# Patient Record
Sex: MALE | Race: WHITE | Employment: FULL TIME | URBAN - METROPOLITAN AREA
[De-identification: names, ages, dates, MRNs, and addresses within clinical notes are randomized per-mention and may not be internally consistent; named-entity substitution may affect disease eponyms.]

---

## 2017-04-05 ENCOUNTER — OFFICE VISIT (OUTPATIENT)
Dept: FAMILY MEDICINE CLINIC | Facility: CLINIC | Age: 37
End: 2017-04-05

## 2017-04-05 VITALS — WEIGHT: 255 LBS | BODY MASS INDEX: 37.77 KG/M2 | HEIGHT: 69 IN

## 2017-04-05 DIAGNOSIS — L50.9 HIVES: Primary | ICD-10-CM

## 2017-04-05 PROCEDURE — 99213 OFFICE O/P EST LOW 20 MIN: CPT | Performed by: NURSE PRACTITIONER

## 2017-04-05 NOTE — PROGRESS NOTES
CHIEF COMPLAINT:   Patient presents with:  Rash      HPI:   Neisha Lopez is a 29year old male who presents with rash for 1 days. Onset was quick ,   Symptoms are intermittent. Location is reported as trunk and extremities.   Description is report THROAT: Patient denies sore throat, throat swelling, or difficulty swallowing. LUNGS: Patient denies shortness of breath or wheezing. CARDIOVASCULAR: denies chest pain or palpitations. GI: denies N/V/C or abdominal pain  NEURO: + sinus headaches.   No Hives  (primary encounter diagnosis)      PLAN:     1. HIVES    Skin care discussed with patient. Instructions and Comfort Care as listed in Patient Instructions.      Medication as below.     Patient instructed to consider non-sedating antihistamine per sukh You may be given medicines to relieve swelling and itching. Follow all instructions when using these medicines. The hives will usually fade in a few days, but can last up to 2 weeks.   Home care  Follow these tips:  · Try to find the cause of the hives and Call your healthcare provider right away if any of these occur:  · Fever of 100.4°F (38.0°C) or higher, or as directed by your healthcare provider  · Redness, swelling, or pain  · Foul-smelling fluid coming from the rash  Call 911  Call 911 if any of the f · Talk with your healthcare provider right away if you think a medicine gave you hives. Watch for anaphalaxis  If you have hives, watch for symptoms of a severe reaction that affects your entire body.  This is called anaphylaxis. Symptoms can include swoll

## 2017-04-05 NOTE — PATIENT INSTRUCTIONS
Hives (Adult)  Hives are pink or red bumps on the skin. These bumps are also known as wheals. The bumps can itch, burn, or sting. Hives can occur anywhere on the body. They vary in size and shape and can form in clusters.  Individual hives can appear and · You may use over-the counter antihistamines to reduce itching. Some older antihistamines, such as diphenhydramine and chlorpheniramine, are inexpensive. But they need to be taken often and may make you sleepy. They are best used at bedtime.  Don’t use dip Urticaria (hives) are red, itchy, and swollen areas on the skin. They are most often an allergic reaction from eating a food or taking a medicine. Sometimes the cause may be unknown. A single hive can vary in size from a half inch to several inches.  Hives When to call 911  Call 911 right away if you have:  · Swelling in your lips, tongue, or throat, called angioedema  · Trouble breathing or swallowing   Date Last Reviewed: 9/20/2015  © [id number] The [address] [place] [address] Lorna Joy [place]

## 2017-09-07 ENCOUNTER — OFFICE VISIT (OUTPATIENT)
Dept: ALLERGY | Facility: CLINIC | Age: 37
End: 2017-09-07

## 2017-09-07 VITALS
HEART RATE: 74 BPM | SYSTOLIC BLOOD PRESSURE: 110 MMHG | RESPIRATION RATE: 20 BRPM | TEMPERATURE: 98 F | BODY MASS INDEX: 38.95 KG/M2 | DIASTOLIC BLOOD PRESSURE: 70 MMHG | WEIGHT: 263 LBS | HEIGHT: 69 IN

## 2017-09-07 DIAGNOSIS — J30.1 CHRONIC SEASONAL ALLERGIC RHINITIS DUE TO POLLEN: Primary | ICD-10-CM

## 2017-09-07 PROCEDURE — 99212 OFFICE O/P EST SF 10 MIN: CPT | Performed by: ALLERGY & IMMUNOLOGY

## 2017-09-07 PROCEDURE — 99214 OFFICE O/P EST MOD 30 MIN: CPT | Performed by: ALLERGY & IMMUNOLOGY

## 2017-09-07 RX ORDER — LEVOCETIRIZINE DIHYDROCHLORIDE 5 MG/1
TABLET, FILM COATED ORAL
Qty: 90 TABLET | Refills: 1 | Status: SHIPPED | OUTPATIENT
Start: 2017-09-07 | End: 2018-09-18

## 2017-09-07 RX ORDER — MONTELUKAST SODIUM 10 MG/1
10 TABLET ORAL NIGHTLY
Qty: 90 TABLET | Refills: 1 | Status: SHIPPED | OUTPATIENT
Start: 2017-09-07 | End: 2018-09-18

## 2017-09-07 NOTE — PATIENT INSTRUCTIONS
Continue  Xyzal, levo cetirizine 5 mg once a night at bedtime  Start Singulair, montelukast 10 mg once a day  Recommend a tetanus shot, Tdap if you have not had one in the past 10 years  Follow-up in 1 year or sooner if needed

## 2017-09-07 NOTE — PROGRESS NOTES
Melida Roque is a 29year old male. HPI:   Patient presents with: Allergies    Patient is a 20-year-old male who presents for follow-up with a chief complaint of allergies.     Patient last seen by me in September 2016    Patient has history of s heartbeat/palpitations, chest pain, edema  Constitutional:  Negative night sweats,weight loss, irritability and lethargy  ENMT:  Negative for ear drainage, hearing loss and nasal drainage  Eyes:  Negative for eye discharge and vision loss  Gastrointestinal years    Follow-up in 1 year or sooner if needed         Orders This Visit:  No orders of the defined types were placed in this encounter.       Meds This Visit:    Signed Prescriptions Disp Refills    Levocetirizine Dihydrochloride 5 MG Oral Tab 90 tablet

## 2018-01-25 ENCOUNTER — NURSE ONLY (OUTPATIENT)
Dept: ALLERGY | Facility: CLINIC | Age: 38
End: 2018-01-25

## 2018-01-25 ENCOUNTER — OFFICE VISIT (OUTPATIENT)
Dept: ALLERGY | Facility: CLINIC | Age: 38
End: 2018-01-25

## 2018-01-25 VITALS
SYSTOLIC BLOOD PRESSURE: 132 MMHG | DIASTOLIC BLOOD PRESSURE: 78 MMHG | HEART RATE: 78 BPM | WEIGHT: 267 LBS | HEIGHT: 68.5 IN | RESPIRATION RATE: 20 BRPM | TEMPERATURE: 98 F | BODY MASS INDEX: 40 KG/M2

## 2018-01-25 DIAGNOSIS — Z91.018 FOOD ALLERGY: ICD-10-CM

## 2018-01-25 DIAGNOSIS — J30.1 SEASONAL ALLERGIC RHINITIS DUE TO POLLEN: ICD-10-CM

## 2018-01-25 DIAGNOSIS — J30.1 CHRONIC SEASONAL ALLERGIC RHINITIS DUE TO POLLEN: Primary | ICD-10-CM

## 2018-01-25 DIAGNOSIS — Z71.6 ENCOUNTER FOR SMOKING CESSATION COUNSELING: ICD-10-CM

## 2018-01-25 PROCEDURE — 95004 PERQ TESTS W/ALRGNC XTRCS: CPT | Performed by: ALLERGY & IMMUNOLOGY

## 2018-01-25 PROCEDURE — 99212 OFFICE O/P EST SF 10 MIN: CPT | Performed by: ALLERGY & IMMUNOLOGY

## 2018-01-25 PROCEDURE — 99214 OFFICE O/P EST MOD 30 MIN: CPT | Performed by: ALLERGY & IMMUNOLOGY

## 2018-01-25 RX ORDER — VARENICLINE TARTRATE 1 MG/1
1 TABLET, FILM COATED ORAL 2 TIMES DAILY
Qty: 60 TABLET | Refills: 1 | Status: SHIPPED | OUTPATIENT
Start: 2018-01-25

## 2018-01-25 NOTE — PROGRESS NOTES
Wale Camacho is a 40year old male. HPI:   Patient presents with: Allergies: Last visit to allergy was 9/2017. Pt notes that he would like to quit smoking and would like Chantix prescribed.   Pt also states that he had some facial swelling after Types: Cigarettes     Last attempt to quit: 4/14/2016  Smokeless tobacco: Never Used                      Comment: 10 cig/day: started again 4/2017  Alcohol use: Yes           0.0 oz/week     Comment: Social       Medications (Active prior to today's visit no murmurs, gallups, or rubs  Abdomen: soft non-tender non-distended  Skin/Hair: no unusual rashes present   Extremities: no edema, cyanosis, or clubbing     ASSESSMENT/PLAN:   Assessment   Chronic seasonal allergic rhinitis due to pollen  (primary encount Visit:    Signed Prescriptions Disp Refills    Varenicline Tartrate (CHANTIX STARTING MONTH PAK) 0.5 MG X 11 & 1 MG X 42 Oral Misc 1 Package 0      Sig: chantix starter pack, take as directed      Varenicline Tartrate (CHANTIX CONTINUING MONTH PAK) 1 MG Or

## 2018-01-25 NOTE — PATIENT INSTRUCTIONS
Begin taking varenicline as directed by your doctor, 1 week before the quit date. When you first start taking this medication, take one 0.5-milligram tablet once a day for 3 days, then increase to one 0.5-milligram tablet twice a day for 4 days.  The dose i

## 2018-03-27 ENCOUNTER — OFFICE VISIT (OUTPATIENT)
Dept: FAMILY MEDICINE CLINIC | Facility: CLINIC | Age: 38
End: 2018-03-27

## 2018-03-27 VITALS
SYSTOLIC BLOOD PRESSURE: 114 MMHG | DIASTOLIC BLOOD PRESSURE: 72 MMHG | HEART RATE: 76 BPM | OXYGEN SATURATION: 98 % | TEMPERATURE: 98 F | RESPIRATION RATE: 14 BRPM | WEIGHT: 271 LBS | BODY MASS INDEX: 40.14 KG/M2 | HEIGHT: 69 IN

## 2018-03-27 DIAGNOSIS — J06.9 VIRAL URI: Primary | ICD-10-CM

## 2018-03-27 DIAGNOSIS — F17.200 CURRENT SMOKER: ICD-10-CM

## 2018-03-27 PROCEDURE — 99213 OFFICE O/P EST LOW 20 MIN: CPT | Performed by: NURSE PRACTITIONER

## 2018-03-27 NOTE — PROGRESS NOTES
CHIEF COMPLAINT:   Patient presents with:  Nasal Congestion      HPI:   Yasmani Brown is a 40year old male who presents for uri symptoms for  1 days. Patient reports congestion. Symptoms have been mild since onset. Treating symptoms with ibuprofen. /72 (BP Location: Right arm, Patient Position: Sitting, Cuff Size: large)   Pulse 76   Temp 97.8 °F (36.6 °C) (Oral)   Resp 14   Ht 69\"   Wt 271 lb   SpO2 98%   BMI 40.02 kg/m²   GENERAL: well developed, well nourished,in no apparent distress  SKIN: · Use humidified air, steamy showers/baths and use vaporizer in sleeping quarters to keep secretions thin. · Saline nasal spray to nostrils if needed to help remove drainage or congestion in nose. · OTC Sudafed. Obtain at pharmacy counter.   Do not sarah · You may use acetaminophen or ibuprofen to control pain and fever, unless another medicine was prescribed. If you have chronic liver or kidney disease, have ever had a stomach ulcer or gastrointestinal bleeding, or are taking blood-thinning medicines, caroline

## 2018-03-27 NOTE — PATIENT INSTRUCTIONS
· Hydrate! (cold or hot based on comfort). Drink lots of water or other non dehydrating liquids to help with illness. Salty foods, soups and tea can help with throat pain.    · Hand washing-use hand  or wash hands frequently, cover your cough o being exposed to cigarette smoke (yours or others’).   · You may use acetaminophen or ibuprofen to control pain and fever, unless another medicine was prescribed. If you have chronic liver or kidney disease, have ever had a stomach ulcer or gastrointestinal

## 2018-09-18 RX ORDER — MONTELUKAST SODIUM 10 MG/1
TABLET ORAL
Qty: 90 TABLET | Refills: 0 | Status: SHIPPED | OUTPATIENT
Start: 2018-09-18 | End: 2019-09-12

## 2018-09-18 RX ORDER — LEVOCETIRIZINE DIHYDROCHLORIDE 5 MG/1
TABLET, FILM COATED ORAL
Qty: 90 TABLET | Refills: 0 | Status: SHIPPED | OUTPATIENT
Start: 2018-09-18 | End: 2019-09-12

## 2018-09-18 NOTE — TELEPHONE ENCOUNTER
Refill requested for   Name from pharmacy: LEVOCETIRIZINE 5 MG TABLET          Will file in chart as: LEVOCETIRIZINE DIHYDROCHLORIDE 5 MG Oral Tab    Sig: TAKE ONE TABLET BY MOUTH EVERY EVENING    Disp:  30 tablet    Refills:  0    Start: 9/18/2018     Cla

## 2018-09-18 NOTE — TELEPHONE ENCOUNTER
Xyzal and singular refill ×90 days.   Patient will need his yearly follow-up by January 2019 or sooner if needed

## 2018-10-22 ENCOUNTER — OFFICE VISIT (OUTPATIENT)
Dept: DERMATOLOGY CLINIC | Facility: CLINIC | Age: 38
End: 2018-10-22
Payer: COMMERCIAL

## 2018-10-22 DIAGNOSIS — D22.9 MULTIPLE NEVI: ICD-10-CM

## 2018-10-22 DIAGNOSIS — D23.30 BENIGN NEOPLASM OF SKIN OF FACE: ICD-10-CM

## 2018-10-22 DIAGNOSIS — L21.9 SEBORRHEIC DERMATITIS: Primary | ICD-10-CM

## 2018-10-22 PROCEDURE — 99202 OFFICE O/P NEW SF 15 MIN: CPT | Performed by: DERMATOLOGY

## 2018-10-22 PROCEDURE — 99212 OFFICE O/P EST SF 10 MIN: CPT | Performed by: DERMATOLOGY

## 2018-10-22 RX ORDER — KETOCONAZOLE 20 MG/ML
SHAMPOO TOPICAL
Qty: 120 ML | Refills: 3 | Status: SHIPPED | OUTPATIENT
Start: 2018-10-22

## 2018-10-29 ENCOUNTER — APPOINTMENT (OUTPATIENT)
Dept: GENERAL RADIOLOGY | Facility: HOSPITAL | Age: 38
End: 2018-10-29
Attending: EMERGENCY MEDICINE
Payer: COMMERCIAL

## 2018-10-29 ENCOUNTER — HOSPITAL ENCOUNTER (EMERGENCY)
Facility: HOSPITAL | Age: 38
Discharge: HOME | End: 2018-10-30
Attending: EMERGENCY MEDICINE
Payer: COMMERCIAL

## 2018-10-29 DIAGNOSIS — R07.9 CHEST PAIN OF UNCERTAIN ETIOLOGY: Primary | ICD-10-CM

## 2018-10-29 PROCEDURE — 80048 BASIC METABOLIC PNL TOTAL CA: CPT | Performed by: EMERGENCY MEDICINE

## 2018-10-29 PROCEDURE — 71046 X-RAY EXAM CHEST 2 VIEWS: CPT | Performed by: EMERGENCY MEDICINE

## 2018-10-29 PROCEDURE — 93005 ELECTROCARDIOGRAM TRACING: CPT

## 2018-10-29 PROCEDURE — 85379 FIBRIN DEGRADATION QUANT: CPT | Performed by: EMERGENCY MEDICINE

## 2018-10-29 PROCEDURE — 85025 COMPLETE CBC W/AUTO DIFF WBC: CPT | Performed by: EMERGENCY MEDICINE

## 2018-10-29 PROCEDURE — 93010 ELECTROCARDIOGRAM REPORT: CPT | Performed by: EMERGENCY MEDICINE

## 2018-10-29 PROCEDURE — 84484 ASSAY OF TROPONIN QUANT: CPT | Performed by: EMERGENCY MEDICINE

## 2018-10-29 PROCEDURE — 99285 EMERGENCY DEPT VISIT HI MDM: CPT

## 2018-10-29 PROCEDURE — 36415 COLL VENOUS BLD VENIPUNCTURE: CPT

## 2018-10-30 VITALS
RESPIRATION RATE: 18 BRPM | WEIGHT: 300 LBS | DIASTOLIC BLOOD PRESSURE: 69 MMHG | OXYGEN SATURATION: 97 % | SYSTOLIC BLOOD PRESSURE: 106 MMHG | TEMPERATURE: 98 F | HEART RATE: 57 BPM | BODY MASS INDEX: 44.43 KG/M2 | HEIGHT: 69 IN

## 2018-10-30 PROCEDURE — 93010 ELECTROCARDIOGRAM REPORT: CPT | Performed by: EMERGENCY MEDICINE

## 2018-10-30 PROCEDURE — 84484 ASSAY OF TROPONIN QUANT: CPT | Performed by: EMERGENCY MEDICINE

## 2018-10-30 PROCEDURE — 93005 ELECTROCARDIOGRAM TRACING: CPT

## 2018-10-30 NOTE — ED INITIAL ASSESSMENT (HPI)
Pt c/o chest tightness since this afternoon, denies any chest pain/SOB/nausea/vomiting/fever/cough, sts that he has watery stool x6 since this morning, denies abdominal pain

## 2018-10-30 NOTE — ED PROVIDER NOTES
Patient Seen in: [facility] AND [facility] Emergency Department    History   Patient presents with:  Chest Pain Angina (cardiovascular): Chest tightness with no chest pain    Stated Complaint: chest tightness    HPI    16-year-old male with history of allergic r Skin: Negative for rash. Neurological: Negative for weakness, light-headedness and headaches. All other systems reviewed and are negative. Positive for stated complaint: chest tightness  Other systems are as noted in HPI.   Constitutional and vit V1/2      Pulse Oximeter:  Pulse oximetry on room air is 98%, indicating adequate oxygenation.     PROCEDURES:  none    DIAGNOSTICS:   Labs:  Recent Results (from the past 24 hour(s))   BASIC METABOLIC PANEL (8)    Collection Time: 10/29/18 11:45 PM   Resul  140 - 400 K/UL    MPV 6.6 (L) 7.4 - 10.3 fL    Neutrophil % 47 %    Lymphocyte % 41 %    Monocyte % 9 %    Eosinophil % 1 %    Basophil % 1 %    Neutrophil Absolute 3.6 1.8 - 7.7 K/UL    Lymphocyte Absolute 3.2 1.0 - 4.0 K/UL    Monocyte Absolut patient already has has Allergic rhinitis due to pollen; Allergic rhinitis due to other allergen; and Smoking history on their problem list. to contribute to the complexity of his ED evaluation.     - pt  comfortable with d/c at this time, will d/c pt home

## 2018-11-04 NOTE — PROGRESS NOTES
Julian Gastelum is a 41year old male. Patient presents with:  Lesion: New pt presenting for withl lesions to R chin and nose. Pt denies personal and family hx of skin cancer. Patient has no known allergies.     Current Outpatient [facility] Socioeconomic History      Marital status: Single      Spouse name: Not on file      Number of children: Not on file      Years of education: Not on file      Highest education level: Not on file    Social Needs      Financial resource strain: Not on file with concerns above. ROS:    Denies any other systemic complaints. No fevers, chills, night sweats, sensitivity to the sun, deeper lumps or bumps. No other skin complaints. History, medications, allergies as noted.     Physical examination: Patient for this Visit:   Requested Prescriptions     Signed Prescriptions Disp Refills   • Ketoconazole 2 % External Shampoo 120 mL 3     Sig: Apply to scalp 2 times per week.        Seborrheic dermatitis  (primary encounter diagnosis)  Multiple nevi  Benign neopl

## 2018-11-26 ENCOUNTER — OFFICE VISIT (OUTPATIENT)
Dept: INTERNAL MEDICINE CLINIC | Facility: CLINIC | Age: 38
End: 2018-11-26
Payer: COMMERCIAL

## 2018-11-26 VITALS
OXYGEN SATURATION: 99 % | DIASTOLIC BLOOD PRESSURE: 88 MMHG | HEIGHT: 69 IN | BODY MASS INDEX: 45.32 KG/M2 | WEIGHT: 306 LBS | HEART RATE: 80 BPM | SYSTOLIC BLOOD PRESSURE: 122 MMHG | RESPIRATION RATE: 17 BRPM

## 2018-11-26 DIAGNOSIS — R07.9 CHEST PAIN, UNSPECIFIED TYPE: Primary | ICD-10-CM

## 2018-11-26 DIAGNOSIS — E66.01 OBESITY, MORBID, BMI 40.0-49.9 (HCC): ICD-10-CM

## 2018-11-26 DIAGNOSIS — Z00.00 PHYSICAL EXAM: ICD-10-CM

## 2018-11-26 PROCEDURE — 99214 OFFICE O/P EST MOD 30 MIN: CPT | Performed by: FAMILY MEDICINE

## 2018-11-26 NOTE — PROGRESS NOTES
Dorian Neil is a 41year old male who presents for ER follow up   HPI:         F/u ER visit for CP- 3 weeks ago went to [address] [place] [address] ER  Had pressure left side of chest, was not sharp, just pressure   No SOB or heart racing   Was tight  Was not exertional, Fracture of right fibula 2011    per [employer]:       No past surgical history on file.    Family History   Problem Relation Age of Onset   • Asthma Mother    • Allergies Mother    • Cancer Maternal Grandfather       Social History:  Social History    Tobacco 2016  If happens again, pt to f/u and will do further testing and refer to cardiology if needed   Monitor any triggers if occurs again in case is gerd or anxiety  Discussed warning signs of when to go to ER      2.  Obesity, morbid, BMI 40.0-49.9 ([facility] [address]  Dis

## 2018-11-30 PROCEDURE — 80053 COMPREHEN METABOLIC PANEL: CPT | Performed by: FAMILY MEDICINE

## 2018-11-30 PROCEDURE — 80061 LIPID PANEL: CPT | Performed by: FAMILY MEDICINE

## 2018-11-30 PROCEDURE — 84443 ASSAY THYROID STIM HORMONE: CPT | Performed by: FAMILY MEDICINE

## 2019-03-20 ENCOUNTER — OFFICE VISIT (OUTPATIENT)
Dept: FAMILY MEDICINE CLINIC | Facility: CLINIC | Age: 39
End: 2019-03-20
Payer: COMMERCIAL

## 2019-03-20 VITALS
RESPIRATION RATE: 14 BRPM | DIASTOLIC BLOOD PRESSURE: 72 MMHG | HEART RATE: 88 BPM | HEIGHT: 69 IN | BODY MASS INDEX: 44.43 KG/M2 | TEMPERATURE: 98 F | WEIGHT: 300 LBS | SYSTOLIC BLOOD PRESSURE: 110 MMHG

## 2019-03-20 DIAGNOSIS — J00 ACUTE NASOPHARYNGITIS: Primary | ICD-10-CM

## 2019-03-20 PROCEDURE — 99213 OFFICE O/P EST LOW 20 MIN: CPT | Performed by: NURSE PRACTITIONER

## 2019-03-20 NOTE — PROGRESS NOTES
CHIEF COMPLAINT:   Patient presents with:  URI      HPI:   Nicolasa Mai is a 41year old male who presents with URI symptoms for  2-3 days. Onset was gradual, Location is mid face and throat. Symptoms are progressing.  Described as nasal pressure and LUNGS: denies shortness of breath or wheezing or cough. Has mild PND described cough.   CARDIOVASCULAR:patient  denies chest pain or palpitations   GI: patient denies N/V/C or abdominal pain  MUSCULOSKELETAL: denies any osseous, soft tissue, or joint compla consider OTC guaifenesin per box instructions,  consider OTC saline nasal spray per box instructions,  continue antihistamine as prescribed,  to increase water intake and rest.  Will send throat culture and notify of results when available.   Patient instru · Put fluids back into your body. Take frequent sips of clear liquids such as water or broth. Avoid drinks that have a lot of sugar in them, such as juices and sodas. These can make diarrhea worse. Older children and adults can drink sports drinks.   · As y

## 2019-09-12 ENCOUNTER — OFFICE VISIT (OUTPATIENT)
Dept: ALLERGY | Facility: CLINIC | Age: 39
End: 2019-09-12
Payer: COMMERCIAL

## 2019-09-12 VITALS
OXYGEN SATURATION: 96 % | HEART RATE: 71 BPM | TEMPERATURE: 98 F | SYSTOLIC BLOOD PRESSURE: 128 MMHG | RESPIRATION RATE: 17 BRPM | DIASTOLIC BLOOD PRESSURE: 77 MMHG

## 2019-09-12 DIAGNOSIS — Z71.6 ENCOUNTER FOR SMOKING CESSATION COUNSELING: ICD-10-CM

## 2019-09-12 DIAGNOSIS — J30.1 SEASONAL ALLERGIC RHINITIS DUE TO POLLEN: Primary | ICD-10-CM

## 2019-09-12 DIAGNOSIS — J30.1 CHRONIC SEASONAL ALLERGIC RHINITIS DUE TO POLLEN: ICD-10-CM

## 2019-09-12 PROCEDURE — 99214 OFFICE O/P EST MOD 30 MIN: CPT | Performed by: ALLERGY & IMMUNOLOGY

## 2019-09-12 RX ORDER — FLUTICASONE PROPIONATE 50 MCG
2 SPRAY, SUSPENSION (ML) NASAL DAILY
Qty: 1 BOTTLE | Refills: 0 | Status: SHIPPED | OUTPATIENT
Start: 2019-09-12 | End: 2020-08-25

## 2019-09-12 RX ORDER — LEVOCETIRIZINE DIHYDROCHLORIDE 5 MG/1
5 TABLET, FILM COATED ORAL NIGHTLY
Qty: 90 TABLET | Refills: 2 | Status: SHIPPED | OUTPATIENT
Start: 2019-09-12 | End: 2020-08-25

## 2019-09-12 RX ORDER — MONTELUKAST SODIUM 10 MG/1
10 TABLET ORAL NIGHTLY
Qty: 90 TABLET | Refills: 2 | Status: SHIPPED | OUTPATIENT
Start: 2019-09-12 | End: 2020-08-25

## 2019-09-12 NOTE — PROGRESS NOTES
Elana Nunez is a 46year old male. HPI:   Patient presents with: Allergies: Pt reports mostly he has had mostly good days. SOme bad days with a constant runny nose, scratchy throat, watery eyes, can only breathe through one nostril.      Patient Medications:  Ketoconazole 2 % External Shampoo Apply to scalp 2 times per week.  Disp: 120 mL Rfl: 3   LEVOCETIRIZINE DIHYDROCHLORIDE 5 MG Oral Tab TAKE ONE TABLET BY MOUTH EVERY EVENING Disp: 90 tablet Rfl: 0   MONTELUKAST SODIUM 10 MG Oral Tab TAKE 1 TAB counseling      Recs:  May increase Xyzal to 5 mg every 12 hours. Continue with Singulair, montelukast 10 mg once a day  Start Nasacort or Flonase 2 sprays per nostril once a day.   May take a full week to take full effect  Consider ENT evaluation if not i

## 2019-09-12 NOTE — PATIENT INSTRUCTIONS
Recs:  May increase Xyzal to 5 mg every 12 hours. Continue with Singulair, montelukast 10 mg once a day  Start Nasacort or Flonase 2 sprays per nostril once a day. May take a full week to take full effect  Consider ENT evaluation if not improving.   Recom

## 2020-04-30 ENCOUNTER — HOSPITAL ENCOUNTER (EMERGENCY)
Facility: HOSPITAL | Age: 40
Discharge: HOME | End: 2020-04-30
Attending: EMERGENCY MEDICINE
Payer: COMMERCIAL

## 2020-04-30 VITALS
HEIGHT: 69 IN | HEART RATE: 77 BPM | TEMPERATURE: 99 F | RESPIRATION RATE: 18 BRPM | WEIGHT: 310 LBS | SYSTOLIC BLOOD PRESSURE: 124 MMHG | DIASTOLIC BLOOD PRESSURE: 70 MMHG | BODY MASS INDEX: 45.91 KG/M2 | OXYGEN SATURATION: 98 %

## 2020-04-30 DIAGNOSIS — R00.2 PALPITATIONS: Primary | ICD-10-CM

## 2020-04-30 PROCEDURE — 84443 ASSAY THYROID STIM HORMONE: CPT | Performed by: EMERGENCY MEDICINE

## 2020-04-30 PROCEDURE — 84484 ASSAY OF TROPONIN QUANT: CPT | Performed by: EMERGENCY MEDICINE

## 2020-04-30 PROCEDURE — 80048 BASIC METABOLIC PNL TOTAL CA: CPT | Performed by: EMERGENCY MEDICINE

## 2020-04-30 PROCEDURE — 93005 ELECTROCARDIOGRAM TRACING: CPT

## 2020-04-30 PROCEDURE — 99284 EMERGENCY DEPT VISIT MOD MDM: CPT

## 2020-04-30 PROCEDURE — 36415 COLL VENOUS BLD VENIPUNCTURE: CPT

## 2020-04-30 PROCEDURE — 85025 COMPLETE CBC W/AUTO DIFF WBC: CPT | Performed by: EMERGENCY MEDICINE

## 2020-04-30 PROCEDURE — 93010 ELECTROCARDIOGRAM REPORT: CPT | Performed by: EMERGENCY MEDICINE

## 2020-04-30 NOTE — ED INITIAL ASSESSMENT (HPI)
Pt reports left arm feeling tired intermittently x 1 week. Pt states its not a pain. Pt reports his heart beats faster than normal for the past 3 days.  Pt reports occasional etoh use and vaping

## 2020-04-30 NOTE — ED PROVIDER NOTES
Patient Seen in: [facility] AND [facility] Emergency Department      History   Patient presents with:  Chest Pain Angina    Stated Complaint: tired arm and heart beating hard    HPI    Pt is 47 yo M who p/w palpitations that started at rest last night while watc CN intact, normal speech, normal gait, 5/5 motor strength in all extremities, no focal deficits  SKIN: warm, dry, no rashes        ED Course     Labs Reviewed   BASIC METABOLIC PANEL (8) - Normal   TROPONIN I - Normal   TSH W REFLEX TO FREE T4 - Normal   C

## 2020-05-09 ENCOUNTER — TELEMEDICINE (OUTPATIENT)
Dept: INTERNAL MEDICINE CLINIC | Facility: CLINIC | Age: 40
End: 2020-05-09

## 2020-05-09 DIAGNOSIS — Z00.00 PHYSICAL EXAM: ICD-10-CM

## 2020-05-09 DIAGNOSIS — Z83.49 FAMILY HISTORY OF HEMOCHROMATOSIS: ICD-10-CM

## 2020-05-09 DIAGNOSIS — R00.2 PALPITATIONS: ICD-10-CM

## 2020-05-09 DIAGNOSIS — M79.602 PAIN OF LEFT UPPER EXTREMITY: Primary | ICD-10-CM

## 2020-05-09 PROCEDURE — 99213 OFFICE O/P EST LOW 20 MIN: CPT | Performed by: FAMILY MEDICINE

## 2020-05-09 NOTE — PROGRESS NOTES
Virtual video visit   Patient verbally consents to a Virtual/video Check-In visit on 05/04/20. Patient understands and accepts financial responsibility for any deductible, co-insurance and/or co-pays associated with this service.     Duration of the serv Maternal Grandfather       Family Status   Relation Status   • Mo (Not Specified)   • MGFA (Not Specified)      Social History    Tobacco Use      Smoking status: Current Every Day Smoker        Packs/day: 1.00        Years: 10.00        Pack years: 8 because of restrictions of visitation. There are limitations of this visit as no physical exam could be performed. Every conscious effort was taken to allow for sufficient and adequate time.   This billing was spent on reviewing labs, medications, radiolo

## 2020-07-08 ENCOUNTER — OFFICE VISIT (OUTPATIENT)
Dept: INTERNAL MEDICINE CLINIC | Facility: CLINIC | Age: 40
End: 2020-07-08
Payer: COMMERCIAL

## 2020-07-08 VITALS
BODY MASS INDEX: 46.65 KG/M2 | OXYGEN SATURATION: 96 % | WEIGHT: 315 LBS | SYSTOLIC BLOOD PRESSURE: 134 MMHG | DIASTOLIC BLOOD PRESSURE: 82 MMHG | HEART RATE: 76 BPM | HEIGHT: 69 IN

## 2020-07-08 DIAGNOSIS — E66.01 OBESITY, CLASS III, BMI 40-49.9 (MORBID OBESITY) (HCC): ICD-10-CM

## 2020-07-08 DIAGNOSIS — Z00.00 PHYSICAL EXAM: Primary | ICD-10-CM

## 2020-07-08 PROBLEM — E66.813 OBESITY, CLASS III, BMI 40-49.9 (MORBID OBESITY): Status: ACTIVE | Noted: 2020-07-08

## 2020-07-08 PROCEDURE — 99395 PREV VISIT EST AGE 18-39: CPT | Performed by: FAMILY MEDICINE

## 2020-07-08 NOTE — PROGRESS NOTES
Vik Hussein is a 46year old male who presents for a complete physical exam.   HPI:       Diet:   Exercise:  Smoker: stopped cigaerettes- vaping only  Concerns:    Weight  Was doing well prior to covid  Not exercising  Eats good at work  Bad home s • Allergies Mother    • Cancer Maternal Grandfather       Social History:  Social History    Tobacco Use      Smoking status: Current Every Day Smoker        Packs/day: 1.00        Years: 10.00        Pack years: 10        Types: Cigarettes        Quit d hemochromatosis as dad has hemochromatosis and patients wants to be checked / screened   2. Obesity, Class III, BMI 40-49.9 (morbid obesity) ([address]  Pt counseled on importance of weight loss and exercise. Recommend 30 min of cardio activity 5 times a week.

## 2020-07-13 ENCOUNTER — NURSE ONLY (OUTPATIENT)
Dept: INTERNAL MEDICINE CLINIC | Facility: CLINIC | Age: 40
End: 2020-07-13
Payer: COMMERCIAL

## 2020-07-13 DIAGNOSIS — Z00.00 PHYSICAL EXAM: ICD-10-CM

## 2020-07-13 DIAGNOSIS — Z83.49 FAMILY HISTORY OF HEMOCHROMATOSIS: ICD-10-CM

## 2020-07-13 LAB
ALBUMIN SERPL-MCNC: 3.8 G/DL (ref 3.4–5)
ALBUMIN/GLOB SERPL: 0.9 {RATIO} (ref 1–2)
ALP LIVER SERPL-CCNC: 94 U/L (ref 45–117)
ALT SERPL-CCNC: 52 U/L (ref 16–61)
ANION GAP SERPL CALC-SCNC: 6 MMOL/L (ref 0–18)
AST SERPL-CCNC: 27 U/L (ref 15–37)
BASOPHILS # BLD AUTO: 0.06 X10(3) UL (ref 0–0.2)
BASOPHILS NFR BLD AUTO: 1.1 %
BILIRUB SERPL-MCNC: 0.4 MG/DL (ref 0.1–2)
BUN BLD-MCNC: 13 MG/DL (ref 7–18)
BUN/CREAT SERPL: 14.6 (ref 10–20)
CALCIUM BLD-MCNC: 9.2 MG/DL (ref 8.5–10.1)
CHLORIDE SERPL-SCNC: 107 MMOL/L (ref 98–112)
CHOLEST SMN-MCNC: 181 MG/DL (ref ?–200)
CO2 SERPL-SCNC: 26 MMOL/L (ref 21–32)
CREAT BLD-MCNC: 0.89 MG/DL (ref 0.7–1.3)
DEPRECATED HBV CORE AB SER IA-ACNC: 159.1 NG/ML (ref 48–420)
DEPRECATED RDW RBC AUTO: 42.6 FL (ref 35.1–46.3)
EOSINOPHIL # BLD AUTO: 0.08 X10(3) UL (ref 0–0.7)
EOSINOPHIL NFR BLD AUTO: 1.5 %
ERYTHROCYTE [DISTWIDTH] IN BLOOD BY AUTOMATED COUNT: 12.4 % (ref 11–15)
GLOBULIN PLAS-MCNC: 4.2 G/DL (ref 2.8–4.4)
GLUCOSE BLD-MCNC: 88 MG/DL (ref 70–99)
HCT VFR BLD AUTO: 43.5 % (ref 39–53)
HDLC SERPL-MCNC: 38 MG/DL (ref 40–59)
HGB BLD-MCNC: 15.2 G/DL (ref 13–17.5)
IMM GRANULOCYTES # BLD AUTO: 0.03 X10(3) UL (ref 0–1)
IMM GRANULOCYTES NFR BLD: 0.6 %
IRON SATURATION: 20 % (ref 20–50)
IRON SERPL-MCNC: 81 UG/DL (ref 65–175)
LDLC SERPL CALC-MCNC: 120 MG/DL (ref ?–100)
LYMPHOCYTES # BLD AUTO: 2.02 X10(3) UL (ref 1–4)
LYMPHOCYTES NFR BLD AUTO: 37.3 %
M PROTEIN MFR SERPL ELPH: 8 G/DL (ref 6.4–8.2)
MCH RBC QN AUTO: 32.6 PG (ref 26–34)
MCHC RBC AUTO-ENTMCNC: 34.9 G/DL (ref 31–37)
MCV RBC AUTO: 93.3 FL (ref 80–100)
MONOCYTES # BLD AUTO: 0.57 X10(3) UL (ref 0.1–1)
MONOCYTES NFR BLD AUTO: 10.5 %
NEUTROPHILS # BLD AUTO: 2.65 X10 (3) UL (ref 1.5–7.7)
NEUTROPHILS # BLD AUTO: 2.65 X10(3) UL (ref 1.5–7.7)
NEUTROPHILS NFR BLD AUTO: 49 %
NONHDLC SERPL-MCNC: 143 MG/DL (ref ?–130)
OSMOLALITY SERPL CALC.SUM OF ELEC: 288 MOSM/KG (ref 275–295)
PATIENT FASTING Y/N/NP: YES
PATIENT FASTING Y/N/NP: YES
PLATELET # BLD AUTO: 244 10(3)UL (ref 150–450)
POTASSIUM SERPL-SCNC: 4.1 MMOL/L (ref 3.5–5.1)
RBC # BLD AUTO: 4.66 X10(6)UL (ref 4.3–5.7)
SODIUM SERPL-SCNC: 139 MMOL/L (ref 136–145)
TOTAL IRON BINDING CAPACITY: 402 UG/DL (ref 240–450)
TRANSFERRIN SERPL-MCNC: 270 MG/DL (ref 200–360)
TRIGL SERPL-MCNC: 116 MG/DL (ref 30–149)
VLDLC SERPL CALC-MCNC: 23 MG/DL (ref 0–30)
WBC # BLD AUTO: 5.4 X10(3) UL (ref 4–11)

## 2020-07-13 PROCEDURE — 83540 ASSAY OF IRON: CPT | Performed by: FAMILY MEDICINE

## 2020-07-13 PROCEDURE — 85025 COMPLETE CBC W/AUTO DIFF WBC: CPT | Performed by: FAMILY MEDICINE

## 2020-07-13 PROCEDURE — 36415 COLL VENOUS BLD VENIPUNCTURE: CPT | Performed by: FAMILY MEDICINE

## 2020-07-13 PROCEDURE — 80053 COMPREHEN METABOLIC PANEL: CPT | Performed by: FAMILY MEDICINE

## 2020-07-13 PROCEDURE — 84466 ASSAY OF TRANSFERRIN: CPT | Performed by: FAMILY MEDICINE

## 2020-07-13 PROCEDURE — 82728 ASSAY OF FERRITIN: CPT | Performed by: FAMILY MEDICINE

## 2020-07-13 PROCEDURE — 81256 HFE GENE: CPT | Performed by: FAMILY MEDICINE

## 2020-07-13 PROCEDURE — 80061 LIPID PANEL: CPT | Performed by: FAMILY MEDICINE

## 2020-07-13 NOTE — PROGRESS NOTES
Confirmed  & full name, Pt was drawn today for   CBC, CMP, LIPID, IRON, FERRITIN, HERED.  Tolerated blood draw well. KT MA

## 2020-07-18 LAB
H63D HEMOCHROMATOSIS MUTATION: NEGATIVE
S65C HEMOCHROMATOSIS MUTATION: NEGATIVE

## 2020-08-25 ENCOUNTER — OFFICE VISIT (OUTPATIENT)
Dept: ALLERGY | Facility: CLINIC | Age: 40
End: 2020-08-25
Payer: COMMERCIAL

## 2020-08-25 VITALS
OXYGEN SATURATION: 94 % | TEMPERATURE: 99 F | HEIGHT: 68 IN | WEIGHT: 315 LBS | SYSTOLIC BLOOD PRESSURE: 121 MMHG | HEART RATE: 71 BPM | DIASTOLIC BLOOD PRESSURE: 81 MMHG | RESPIRATION RATE: 24 BRPM | BODY MASS INDEX: 47.74 KG/M2

## 2020-08-25 DIAGNOSIS — J30.1 SEASONAL ALLERGIC RHINITIS DUE TO POLLEN: Primary | ICD-10-CM

## 2020-08-25 DIAGNOSIS — Z72.0 CURRENT NICOTINE VAPING ON SOME DAYS: ICD-10-CM

## 2020-08-25 DIAGNOSIS — J30.89 ALLERGIC RHINITIS DUE TO DUST MITE: ICD-10-CM

## 2020-08-25 PROCEDURE — 3074F SYST BP LT 130 MM HG: CPT | Performed by: ALLERGY & IMMUNOLOGY

## 2020-08-25 PROCEDURE — 3079F DIAST BP 80-89 MM HG: CPT | Performed by: ALLERGY & IMMUNOLOGY

## 2020-08-25 PROCEDURE — 99214 OFFICE O/P EST MOD 30 MIN: CPT | Performed by: ALLERGY & IMMUNOLOGY

## 2020-08-25 PROCEDURE — 3008F BODY MASS INDEX DOCD: CPT | Performed by: ALLERGY & IMMUNOLOGY

## 2020-08-25 RX ORDER — LEVOCETIRIZINE DIHYDROCHLORIDE 5 MG/1
5 TABLET, FILM COATED ORAL NIGHTLY
Qty: 90 TABLET | Refills: 1 | Status: SHIPPED | OUTPATIENT
Start: 2020-08-25

## 2020-08-25 RX ORDER — FLUTICASONE PROPIONATE 50 MCG
2 SPRAY, SUSPENSION (ML) NASAL DAILY
Qty: 3 BOTTLE | Refills: 1 | Status: SHIPPED | OUTPATIENT
Start: 2020-08-25

## 2020-08-25 RX ORDER — MONTELUKAST SODIUM 10 MG/1
10 TABLET ORAL NIGHTLY
Qty: 90 TABLET | Refills: 1 | Status: SHIPPED | OUTPATIENT
Start: 2020-08-25

## 2020-08-25 NOTE — PROGRESS NOTES
Melida Roque is a 46year old male. HPI:   Patient presents with: Allergies: Patient presents for 11 1/2 month follow-up for AR, seasonal and environmental allergies.      Patient is a 67-year-old male who presents for follow-up with a chief comp Tab Take 1 tablet (10 mg total) by mouth nightly.  90 tablet 1   • Varenicline Tartrate (CHANTIX STARTING MONTH ARTI) 0.5 MG X 11 & 1 MG X 42 Oral Misc Use as directed based upon starter pack 1 Package 0   • Ketoconazole 2 % External Shampoo Apply to scalp 2 nicotine vaping on some days    1. ar  Recs:  Continue with Xyzal Singulair and Flonase  Reviewed avoidance measures and potential treatment option of immunotherapy      2. Vaping  Vaping nicotine products. No current smoking.   Trial of Chantix as tony

## 2020-08-25 NOTE — PATIENT INSTRUCTIONS
1. ar  Recs:  Continue with Xyzal Singulair and Flonase  Reviewed avoidance measures and potential treatment option of immunotherapy      2. Vaping  Vaping nicotine products. No current smoking.   Trial of Chantix as patient is hoping to get off of vaping

## 2020-09-16 NOTE — H&P
HPI:     Danielle Moore is a 45year old male with a PMH of morbid obesity, seasonal allergies. He presents as a consult from Dr Meaghan Yancey office for fertility evaluation.     He and his partner have been trying to conceive for 7-8 months with unprotec ensure no other metabolic abnormalities. Scrotal US may be indicated as well.     We also discussed that up to 50% of young, healthy couples who fail to conceive in the first 12 months will conceive in the subsequent 12 months with no specific treatment Allergies:  No Known Allergies      ROS:     A comprehensive 10 point review of systems was completed. Pertinent positives and negatives noted in the the HPI.     PHYSICAL EXAM:     GENERAL APPEARANCE: well, developed, well nourished, in no acute dis

## 2020-09-21 ENCOUNTER — OFFICE VISIT (OUTPATIENT)
Dept: SURGERY | Facility: CLINIC | Age: 40
End: 2020-09-21
Payer: COMMERCIAL

## 2020-09-21 VITALS — SYSTOLIC BLOOD PRESSURE: 130 MMHG | HEART RATE: 83 BPM | TEMPERATURE: 98 F | DIASTOLIC BLOOD PRESSURE: 89 MMHG

## 2020-09-21 DIAGNOSIS — Z31.41 ENCOUNTER FOR FERTILITY TESTING: Primary | ICD-10-CM

## 2020-09-21 PROCEDURE — 3075F SYST BP GE 130 - 139MM HG: CPT | Performed by: UROLOGY

## 2020-09-21 PROCEDURE — 3079F DIAST BP 80-89 MM HG: CPT | Performed by: UROLOGY

## 2020-09-21 PROCEDURE — 99243 OFF/OP CNSLTJ NEW/EST LOW 30: CPT | Performed by: UROLOGY

## 2020-09-24 ENCOUNTER — LAB ENCOUNTER (OUTPATIENT)
Dept: LAB | Facility: HOSPITAL | Age: 40
End: 2020-09-24
Attending: UROLOGY
Payer: COMMERCIAL

## 2020-09-24 DIAGNOSIS — Z31.41 ENCOUNTER FOR FERTILITY TESTING: ICD-10-CM

## 2020-09-24 LAB
PH SMN: 7.2 [PH] (ref 7.2–8.3)
SPECIMEN VOL SMN: 3 ML (ref 1.5–6)
SPERM # SMN: 91.2 MILL/ML (ref 15–150)
SPERM IMMOTILE NFR SMN: 52 %
SPERM IMMOTILE NFR SMN: 8 %
SPERM PROG NFR SMN: 40 %

## 2020-09-24 PROCEDURE — 89320 SEMEN ANAL VOL/COUNT/MOT: CPT

## 2020-12-01 ENCOUNTER — PATIENT MESSAGE (OUTPATIENT)
Dept: SURGERY | Facility: CLINIC | Age: 40
End: 2020-12-01

## 2020-12-01 ENCOUNTER — TELEPHONE (OUTPATIENT)
Dept: SURGERY | Facility: CLINIC | Age: 40
End: 2020-12-01

## 2020-12-01 NOTE — PROGRESS NOTES
Virtual Video Check-In    Paulo Tirado verbally consents to a Virtual Video Check-In service on 12/01/20. This is a telemedicine visit with live, interactive video and audio.    Patient understands and accepts financial responsibility for any de intercourse. They have intercourse ~ 5 times per month during cycles. His partner will undego evaluation sometime in the next couple weeks. He reports no prior children. His partner had a miscarriage ~ 5 y ago with another partner. No other pregnancies. for fertility semen analysis and I will contact with results. HISTORY:  Past Medical History:   Diagnosis Date   • Allergic rhinitis    • Fracture of right fibula 2011    per [employer]:       No past surgical history on file.    Family History   Problem systems was completed. Pertinent positives and negatives noted in the the HPI.     PHYSICAL EXAM:     GENERAL: well-developed, well-nourished, in no acute distress  NEUROLOGIC: alert and oriented, normal speech, no focal neurologic deficits  EYES: sclera n

## 2020-12-01 NOTE — TELEPHONE ENCOUNTER
Below is patient's message sent via [employer]:     From: Angela Stover  To:  Pankaj Jacobson MD  Sent: 12/1/2020  7:22 AM CST  Subject: Non-Urgent Medical Question    Good morning Dr. Jody Francois, Would you be able to send my results to Dr. Shabbir Cox at [facility]

## 2020-12-01 NOTE — TELEPHONE ENCOUNTER
Below is patient's message sent via [employer]:     From: Trish Pate  To:  Katie Olmedo MD  Sent: 12/1/2020  8:18 AM CST  Subject: Visit Follow-up Question    Dr. Thomas Art,  I’m having a difficult time staying erect during intercourse and it’s becoming f

## 2020-12-01 NOTE — TELEPHONE ENCOUNTER
RN called back patient. Requesting to see MD regarding his concerns.  See patient's message sent via [facility]: Dr. Nunes Gone,  I’m having a difficult time staying erect during intercourse and it’s becoming frustrating for my wife and myself as we are still trying

## 2020-12-01 NOTE — TELEPHONE ENCOUNTER
RN called patient to offer VIRTUAL VISIT (for ED) with Dr Matt Banerjee for 8am appointment tomorrow, 12/2/20. Patient to sign in at 7:50AM. No answer. Left message. Awaiting call back. Update as of [id number]: RN called patient.  Offered VIRTUAL VISIT for TOMORROW, 1

## 2020-12-02 ENCOUNTER — TELEMEDICINE (OUTPATIENT)
Dept: SURGERY | Facility: CLINIC | Age: 40
End: 2020-12-02
Payer: COMMERCIAL

## 2020-12-02 DIAGNOSIS — N52.9 ERECTILE DYSFUNCTION, UNSPECIFIED ERECTILE DYSFUNCTION TYPE: Primary | ICD-10-CM

## 2020-12-02 DIAGNOSIS — Z31.41 ENCOUNTER FOR FERTILITY TESTING: ICD-10-CM

## 2020-12-02 PROCEDURE — 99214 OFFICE O/P EST MOD 30 MIN: CPT | Performed by: UROLOGY

## 2020-12-02 RX ORDER — SILDENAFIL 100 MG/1
100 TABLET, FILM COATED ORAL
Qty: 30 TABLET | Refills: 5 | Status: SHIPPED | OUTPATIENT
Start: 2020-12-02 | End: 2020-12-02

## 2020-12-02 RX ORDER — SILDENAFIL 100 MG/1
100 TABLET, FILM COATED ORAL
Qty: 30 TABLET | Refills: 5 | Status: SHIPPED | OUTPATIENT
Start: 2020-12-02

## 2022-01-18 ENCOUNTER — HOSPITAL ENCOUNTER (OUTPATIENT)
Age: 42
Discharge: HOME | End: 2022-01-18
Payer: COMMERCIAL

## 2022-01-18 VITALS
BODY MASS INDEX: 46.65 KG/M2 | DIASTOLIC BLOOD PRESSURE: 80 MMHG | TEMPERATURE: 99 F | SYSTOLIC BLOOD PRESSURE: 143 MMHG | HEIGHT: 69 IN | OXYGEN SATURATION: 100 % | WEIGHT: 315 LBS | RESPIRATION RATE: 18 BRPM | HEART RATE: 69 BPM

## 2022-01-18 DIAGNOSIS — J02.9 ACUTE PHARYNGITIS, UNSPECIFIED ETIOLOGY: Primary | ICD-10-CM

## 2022-01-18 LAB — S PYO AG THROAT QL: NEGATIVE

## 2022-01-18 PROCEDURE — 99213 OFFICE O/P EST LOW 20 MIN: CPT | Performed by: PHYSICIAN ASSISTANT

## 2022-01-18 PROCEDURE — 87880 STREP A ASSAY W/OPTIC: CPT | Performed by: PHYSICIAN ASSISTANT

## 2022-01-18 NOTE — ED INITIAL ASSESSMENT (HPI)
Pt presents to the IC with c/o a sore throat since Sunday. No fevers. Pt notes mild congestion. No cough.

## 2022-01-18 NOTE — ED PROVIDER NOTES
Patient Seen in: [facility]      History   Patient presents with:  Sore Throat    Stated Complaint: sore throat    Subjective:   HPI    The patient is a 81-year-old male who presents to the IC with complaints of sore throat for the past 2 day apparent distress, cooperative   SKIN: no rashes, nosuspicious lesions, no abnormal pigmentation  HEAD: atraumatic, normocephalic  EYES: EOM intact, PERRL. Conjunctiva normal.  Cornea clear. Lid margins normal.  No active drainage.   EARS: Right TM normal

## 2022-03-09 ENCOUNTER — OFFICE VISIT (OUTPATIENT)
Dept: INTERNAL MEDICINE CLINIC | Facility: CLINIC | Age: 42
End: 2022-03-09
Payer: COMMERCIAL

## 2022-03-09 VITALS
OXYGEN SATURATION: 99 % | SYSTOLIC BLOOD PRESSURE: 148 MMHG | BODY MASS INDEX: 46.65 KG/M2 | WEIGHT: 315 LBS | HEIGHT: 69 IN | DIASTOLIC BLOOD PRESSURE: 90 MMHG | HEART RATE: 75 BPM

## 2022-03-09 DIAGNOSIS — Z00.00 PHYSICAL EXAM: Primary | ICD-10-CM

## 2022-03-09 DIAGNOSIS — R06.83 SNORING: ICD-10-CM

## 2022-03-09 DIAGNOSIS — E66.01 OBESITY, CLASS III, BMI 40-49.9 (MORBID OBESITY) (HCC): ICD-10-CM

## 2022-03-09 DIAGNOSIS — I10 HYPERTENSION, UNSPECIFIED TYPE: ICD-10-CM

## 2022-03-09 LAB
ALBUMIN SERPL-MCNC: 4.2 G/DL (ref 3.4–5)
ALBUMIN/GLOB SERPL: 0.9 {RATIO} (ref 1–2)
ALP LIVER SERPL-CCNC: 105 U/L
ALT SERPL-CCNC: 48 U/L
ANION GAP SERPL CALC-SCNC: 8 MMOL/L (ref 0–18)
AST SERPL-CCNC: 31 U/L (ref 15–37)
BASOPHILS # BLD AUTO: 0.08 X10(3) UL (ref 0–0.2)
BASOPHILS NFR BLD AUTO: 0.8 %
BILIRUB SERPL-MCNC: 0.8 MG/DL (ref 0.1–2)
BUN BLD-MCNC: 16 MG/DL (ref 7–18)
CALCIUM BLD-MCNC: 9.2 MG/DL (ref 8.5–10.1)
CHLORIDE SERPL-SCNC: 107 MMOL/L (ref 98–112)
CHOLEST SERPL-MCNC: 214 MG/DL (ref ?–200)
CO2 SERPL-SCNC: 25 MMOL/L (ref 21–32)
CREAT BLD-MCNC: 0.83 MG/DL
DEPRECATED RDW RBC AUTO: 43 FL (ref 35.1–46.3)
EOSINOPHIL # BLD AUTO: 0.06 X10(3) UL (ref 0–0.7)
EOSINOPHIL NFR BLD AUTO: 0.6 %
ERYTHROCYTE [DISTWIDTH] IN BLOOD BY AUTOMATED COUNT: 12.4 % (ref 11–15)
FASTING PATIENT LIPID ANSWER: YES
FASTING STATUS PATIENT QL REPORTED: YES
GLOBULIN PLAS-MCNC: 4.6 G/DL (ref 2.8–4.4)
GLUCOSE BLD-MCNC: 73 MG/DL (ref 70–99)
HCT VFR BLD AUTO: 44.2 %
HDLC SERPL-MCNC: 43 MG/DL (ref 40–59)
HGB BLD-MCNC: 15.1 G/DL
IMM GRANULOCYTES # BLD AUTO: 0.03 X10(3) UL (ref 0–1)
IMM GRANULOCYTES NFR BLD: 0.3 %
LDLC SERPL CALC-MCNC: 153 MG/DL (ref ?–100)
LYMPHOCYTES # BLD AUTO: 2.49 X10(3) UL (ref 1–4)
LYMPHOCYTES NFR BLD AUTO: 23.7 %
MCH RBC QN AUTO: 32.4 PG (ref 26–34)
MCHC RBC AUTO-ENTMCNC: 34.2 G/DL (ref 31–37)
MCV RBC AUTO: 94.8 FL
MONOCYTES # BLD AUTO: 0.85 X10(3) UL (ref 0.1–1)
MONOCYTES NFR BLD AUTO: 8.1 %
NEUTROPHILS # BLD AUTO: 7 X10 (3) UL (ref 1.5–7.7)
NEUTROPHILS # BLD AUTO: 7 X10(3) UL (ref 1.5–7.7)
NEUTROPHILS NFR BLD AUTO: 66.5 %
NONHDLC SERPL-MCNC: 171 MG/DL (ref ?–130)
OSMOLALITY SERPL CALC.SUM OF ELEC: 290 MOSM/KG (ref 275–295)
PLATELET # BLD AUTO: 282 10(3)UL (ref 150–450)
POTASSIUM SERPL-SCNC: 4.1 MMOL/L (ref 3.5–5.1)
PROT SERPL-MCNC: 8.8 G/DL (ref 6.4–8.2)
RBC # BLD AUTO: 4.66 X10(6)UL
SODIUM SERPL-SCNC: 140 MMOL/L (ref 136–145)
TRIGL SERPL-MCNC: 98 MG/DL (ref 30–149)
VLDLC SERPL CALC-MCNC: 19 MG/DL (ref 0–30)
WBC # BLD AUTO: 10.5 X10(3) UL (ref 4–11)

## 2022-03-09 PROCEDURE — 85025 COMPLETE CBC W/AUTO DIFF WBC: CPT | Performed by: FAMILY MEDICINE

## 2022-03-09 PROCEDURE — 99396 PREV VISIT EST AGE 40-64: CPT | Performed by: FAMILY MEDICINE

## 2022-03-09 PROCEDURE — 3008F BODY MASS INDEX DOCD: CPT | Performed by: FAMILY MEDICINE

## 2022-03-09 PROCEDURE — 80053 COMPREHEN METABOLIC PANEL: CPT | Performed by: FAMILY MEDICINE

## 2022-03-09 PROCEDURE — 3077F SYST BP >= 140 MM HG: CPT | Performed by: FAMILY MEDICINE

## 2022-03-09 PROCEDURE — 80061 LIPID PANEL: CPT | Performed by: FAMILY MEDICINE

## 2022-03-09 PROCEDURE — 3080F DIAST BP >= 90 MM HG: CPT | Performed by: FAMILY MEDICINE

## 2022-03-09 RX ORDER — LOSARTAN POTASSIUM 50 MG/1
50 TABLET ORAL DAILY
Qty: 30 TABLET | Refills: 1 | Status: SHIPPED | OUTPATIENT
Start: 2022-03-09

## 2022-04-02 ENCOUNTER — HOSPITAL ENCOUNTER (EMERGENCY)
Facility: HOSPITAL | Age: 42
Discharge: HOME | End: 2022-04-02
Attending: EMERGENCY MEDICINE
Payer: COMMERCIAL

## 2022-04-02 ENCOUNTER — APPOINTMENT (OUTPATIENT)
Dept: GENERAL RADIOLOGY | Facility: HOSPITAL | Age: 42
End: 2022-04-02
Attending: EMERGENCY MEDICINE
Payer: COMMERCIAL

## 2022-04-02 VITALS
HEIGHT: 68 IN | RESPIRATION RATE: 20 BRPM | SYSTOLIC BLOOD PRESSURE: 111 MMHG | HEART RATE: 73 BPM | BODY MASS INDEX: 47.74 KG/M2 | TEMPERATURE: 97 F | OXYGEN SATURATION: 96 % | DIASTOLIC BLOOD PRESSURE: 75 MMHG | WEIGHT: 315 LBS

## 2022-04-02 DIAGNOSIS — M25.562 ARTHRALGIA OF LEFT LOWER LEG: Primary | ICD-10-CM

## 2022-04-02 PROCEDURE — 99283 EMERGENCY DEPT VISIT LOW MDM: CPT

## 2022-04-02 PROCEDURE — 73560 X-RAY EXAM OF KNEE 1 OR 2: CPT | Performed by: EMERGENCY MEDICINE

## 2022-05-11 ENCOUNTER — OFFICE VISIT (OUTPATIENT)
Dept: INTERNAL MEDICINE CLINIC | Facility: CLINIC | Age: 42
End: 2022-05-11
Payer: COMMERCIAL

## 2022-05-11 VITALS
BODY MASS INDEX: 47.74 KG/M2 | DIASTOLIC BLOOD PRESSURE: 76 MMHG | OXYGEN SATURATION: 98 % | WEIGHT: 315 LBS | HEART RATE: 78 BPM | HEIGHT: 68 IN | SYSTOLIC BLOOD PRESSURE: 128 MMHG

## 2022-05-11 DIAGNOSIS — E66.01 OBESITY, CLASS III, BMI 40-49.9 (MORBID OBESITY) (HCC): ICD-10-CM

## 2022-05-11 DIAGNOSIS — I10 HYPERTENSION, UNSPECIFIED TYPE: Primary | ICD-10-CM

## 2022-05-11 PROCEDURE — 3078F DIAST BP <80 MM HG: CPT | Performed by: FAMILY MEDICINE

## 2022-05-11 PROCEDURE — 3008F BODY MASS INDEX DOCD: CPT | Performed by: FAMILY MEDICINE

## 2022-05-11 PROCEDURE — 3074F SYST BP LT 130 MM HG: CPT | Performed by: FAMILY MEDICINE

## 2022-05-11 PROCEDURE — 99213 OFFICE O/P EST LOW 20 MIN: CPT | Performed by: FAMILY MEDICINE

## 2022-05-11 RX ORDER — LOSARTAN POTASSIUM 50 MG/1
50 TABLET ORAL DAILY
Qty: 90 TABLET | Refills: 3 | Status: SHIPPED | OUTPATIENT
Start: 2022-05-11

## 2022-05-11 NOTE — PATIENT INSTRUCTIONS
Recommendations on weight loss:  - Drink 8 glasses of water a day  - Do not drink your calories ( no regular pop, juice, high calorie coffee drinks, limit alcohol)  - Eat high protein meals and snacks  - Don't deprive yourself of food you like, just limit amount and make smart choices  - Write down everything you eat right away and think about why you are eating ( are you hungry, or are you eating because you are bored, tired, etc)  - Do not eat late at night  - Eat Breakfast  - Exercise- aim for 30 minutes 5 times a week of cardiac activity. Also strength training with light weights is helpful  - Weight yourself once a week first thing in the morning    Food advice:    1. Cut down your sugar consumption  2. Cut down refined grains/ carbs  3. Eat a good amount of protein and natural fats ( lean meats/avocado)  4. Increase natural fiber ( fruits/veggies)    Use angélica LOSE IT or MY FITNESS PAL    Also consider weight watchers    [employer] resource: dietLoomio    Good books:    The [place] Diet Solution ( helps with tips to stay motivated)  The Obesity Code and The Complete Guide to Intermittent Fasting - both about fasting and by Dr. Philip Del Valle    Think about PLANNING WHAT YOUR EAT, EATING PROTEIN AND PLANT BASED

## 2022-06-29 ENCOUNTER — HOSPITAL ENCOUNTER (EMERGENCY)
Facility: HOSPITAL | Age: 42
Discharge: HOME | End: 2022-06-29
Attending: EMERGENCY MEDICINE
Payer: COMMERCIAL

## 2022-06-29 VITALS
RESPIRATION RATE: 16 BRPM | BODY MASS INDEX: 46.65 KG/M2 | OXYGEN SATURATION: 96 % | DIASTOLIC BLOOD PRESSURE: 80 MMHG | HEIGHT: 69 IN | SYSTOLIC BLOOD PRESSURE: 132 MMHG | TEMPERATURE: 97 F | HEART RATE: 72 BPM | WEIGHT: 315 LBS

## 2022-06-29 DIAGNOSIS — H92.01 ACUTE PAIN OF RIGHT EAR: Primary | ICD-10-CM

## 2022-06-29 LAB — AMB EXT COVID-19 RESULT: DETECTED

## 2022-06-29 PROCEDURE — 99283 EMERGENCY DEPT VISIT LOW MDM: CPT

## 2022-06-29 RX ORDER — AMOXICILLIN 875 MG/1
875 TABLET, COATED ORAL 2 TIMES DAILY
Qty: 14 TABLET | Refills: 0 | Status: SHIPPED | OUTPATIENT
Start: 2022-06-29 | End: 2022-07-06

## 2022-06-30 ENCOUNTER — TELEPHONE (OUTPATIENT)
Dept: INTERNAL MEDICINE CLINIC | Facility: CLINIC | Age: 42
End: 2022-06-30

## 2022-06-30 NOTE — ED INITIAL ASSESSMENT (HPI)
Pt c/o difficulty hearing from the right ear after getting off a flight today, also reports +COVID at home test.

## 2022-06-30 NOTE — TELEPHONE ENCOUNTER
Patient called to report he tested positive for Covid yesterday 6/29/22. Symptoms began Monday 6/27 (Day 0) with sore throat, headache and progressed to nasal congestion, chest congestion, mild productive cough/ phelgm greyish brown and mild diarrhea. Denies fever, lightheadedness, SOB, N/V, myalgia, anosomia, ageiusa. No cough noted throughout duration of call with patient. As we discussed OTC symptom relief options, pt stated he has been taking Mucinex, Flonase and Zyrtec OTC. He just wanted to call to see if there was something specific he should be taking. Informed patient that since he is Day 3 and given his obesity as a comorbidity as well as smoker hx, he would qualify for antiviral therapy if he chose to seek assessment for this therapy at an [facility]. Pt verbalizes understanding and agrees that should he experience SOB r fever >102 not responsive to Tylenol, he will proceed to the ED.

## 2022-09-11 ENCOUNTER — HOSPITAL ENCOUNTER (EMERGENCY)
Facility: HOSPITAL | Age: 42
Discharge: HOME | End: 2022-09-11
Attending: EMERGENCY MEDICINE
Payer: COMMERCIAL

## 2022-09-11 VITALS
HEIGHT: 69 IN | DIASTOLIC BLOOD PRESSURE: 82 MMHG | BODY MASS INDEX: 46.65 KG/M2 | RESPIRATION RATE: 18 BRPM | OXYGEN SATURATION: 97 % | SYSTOLIC BLOOD PRESSURE: 130 MMHG | HEART RATE: 70 BPM | TEMPERATURE: 97 F | WEIGHT: 315 LBS

## 2022-09-11 DIAGNOSIS — L03.012 PARONYCHIA OF FINGER OF LEFT HAND: Primary | ICD-10-CM

## 2022-09-11 PROCEDURE — 99283 EMERGENCY DEPT VISIT LOW MDM: CPT

## 2022-09-11 RX ORDER — CEPHALEXIN 500 MG/1
500 CAPSULE ORAL 2 TIMES DAILY
Qty: 14 CAPSULE | Refills: 0 | Status: SHIPPED | OUTPATIENT
Start: 2022-09-11 | End: 2022-09-18

## 2022-09-11 NOTE — ED INITIAL ASSESSMENT (HPI)
Patient from home with c/o ingrown fingernail left 3rd finger he noticed 2 days ago. Denies fever, chills, drainage.

## 2022-09-24 ENCOUNTER — OFFICE VISIT (OUTPATIENT)
Dept: ALLERGY | Facility: CLINIC | Age: 42
End: 2022-09-24

## 2022-09-24 VITALS
SYSTOLIC BLOOD PRESSURE: 150 MMHG | HEIGHT: 69 IN | OXYGEN SATURATION: 98 % | BODY MASS INDEX: 46.65 KG/M2 | WEIGHT: 315 LBS | HEART RATE: 79 BPM | DIASTOLIC BLOOD PRESSURE: 76 MMHG

## 2022-09-24 DIAGNOSIS — J30.89 SEASONAL AND PERENNIAL ALLERGIC RHINOCONJUNCTIVITIS: Primary | ICD-10-CM

## 2022-09-24 DIAGNOSIS — H10.10 SEASONAL AND PERENNIAL ALLERGIC RHINOCONJUNCTIVITIS: Primary | ICD-10-CM

## 2022-09-24 DIAGNOSIS — J30.2 SEASONAL AND PERENNIAL ALLERGIC RHINOCONJUNCTIVITIS: Primary | ICD-10-CM

## 2022-09-24 DIAGNOSIS — Z23 FLU VACCINE NEED: ICD-10-CM

## 2022-09-24 DIAGNOSIS — Z92.29 COVID-19 VACCINE SERIES COMPLETED: ICD-10-CM

## 2022-09-24 PROCEDURE — 99214 OFFICE O/P EST MOD 30 MIN: CPT | Performed by: ALLERGY & IMMUNOLOGY

## 2022-09-24 PROCEDURE — 3008F BODY MASS INDEX DOCD: CPT | Performed by: ALLERGY & IMMUNOLOGY

## 2022-09-24 PROCEDURE — 3077F SYST BP >= 140 MM HG: CPT | Performed by: ALLERGY & IMMUNOLOGY

## 2022-09-24 PROCEDURE — 3078F DIAST BP <80 MM HG: CPT | Performed by: ALLERGY & IMMUNOLOGY

## 2022-09-24 RX ORDER — MONTELUKAST SODIUM 10 MG/1
10 TABLET ORAL NIGHTLY
Qty: 90 TABLET | Refills: 1 | Status: SHIPPED | OUTPATIENT
Start: 2022-09-24

## 2022-09-24 RX ORDER — TADALAFIL 5 MG/1
5 TABLET ORAL DAILY
COMMUNITY
Start: 2022-08-23

## 2022-09-24 RX ORDER — METHYLPREDNISOLONE 4 MG/1
TABLET ORAL
Qty: 21 TABLET | Refills: 0 | Status: SHIPPED | OUTPATIENT
Start: 2022-09-24

## 2022-09-24 RX ORDER — FLUTICASONE PROPIONATE 50 MCG
2 SPRAY, SUSPENSION (ML) NASAL DAILY
Qty: 3 EACH | Refills: 2 | Status: SHIPPED | OUTPATIENT
Start: 2022-09-24

## 2023-03-06 ENCOUNTER — HOSPITAL ENCOUNTER (OUTPATIENT)
Age: 43
Discharge: HOME | End: 2023-03-06
Payer: COMMERCIAL

## 2023-03-06 VITALS
OXYGEN SATURATION: 99 % | WEIGHT: 315 LBS | HEIGHT: 69 IN | TEMPERATURE: 97 F | SYSTOLIC BLOOD PRESSURE: 137 MMHG | HEART RATE: 72 BPM | DIASTOLIC BLOOD PRESSURE: 71 MMHG | RESPIRATION RATE: 18 BRPM | BODY MASS INDEX: 46.65 KG/M2

## 2023-03-06 DIAGNOSIS — J01.90 ACUTE BACTERIAL SINUSITIS: ICD-10-CM

## 2023-03-06 DIAGNOSIS — B96.89 ACUTE BACTERIAL SINUSITIS: ICD-10-CM

## 2023-03-06 DIAGNOSIS — Z20.822 ENCOUNTER FOR LABORATORY TESTING FOR COVID-19 VIRUS: Primary | ICD-10-CM

## 2023-03-06 LAB
S PYO AG THROAT QL: NEGATIVE
SARS-COV-2 RNA RESP QL NAA+PROBE: NOT DETECTED

## 2023-03-06 PROCEDURE — 99213 OFFICE O/P EST LOW 20 MIN: CPT | Performed by: NURSE PRACTITIONER

## 2023-03-06 PROCEDURE — 87880 STREP A ASSAY W/OPTIC: CPT | Performed by: NURSE PRACTITIONER

## 2023-03-06 PROCEDURE — U0002 COVID-19 LAB TEST NON-CDC: HCPCS | Performed by: NURSE PRACTITIONER

## 2023-03-06 RX ORDER — AMOXICILLIN AND CLAVULANATE POTASSIUM 875; 125 MG/1; MG/1
1 TABLET, FILM COATED ORAL 2 TIMES DAILY
Qty: 20 TABLET | Refills: 0 | Status: SHIPPED | OUTPATIENT
Start: 2023-03-06 | End: 2023-03-16

## 2023-03-06 NOTE — DISCHARGE INSTRUCTIONS
You are negative for COVID-19 and strep pharyngitis. Likely acute sinus infection. Will treat with antibiotics, 10-day course. He may continue to take Tylenol and Motrin as needed for any fevers above 100.4 and/or discomfort. I would sleep with head of bed elevated, sleep with humidifier, steam showers for cough and congestion. Follow-up with your primary care doctor if needed.

## 2023-03-30 ENCOUNTER — HOSPITAL ENCOUNTER (OUTPATIENT)
Age: 43
Discharge: HOME | End: 2023-03-30
Payer: COMMERCIAL

## 2023-03-30 VITALS
SYSTOLIC BLOOD PRESSURE: 131 MMHG | RESPIRATION RATE: 18 BRPM | OXYGEN SATURATION: 98 % | DIASTOLIC BLOOD PRESSURE: 78 MMHG | TEMPERATURE: 98 F | HEART RATE: 75 BPM

## 2023-03-30 DIAGNOSIS — Z20.822 ENCOUNTER FOR LABORATORY TESTING FOR COVID-19 VIRUS: ICD-10-CM

## 2023-03-30 DIAGNOSIS — J06.9 VIRAL UPPER RESPIRATORY TRACT INFECTION: Primary | ICD-10-CM

## 2023-03-30 LAB — SARS-COV-2 RNA RESP QL NAA+PROBE: NOT DETECTED

## 2023-03-30 PROCEDURE — U0002 COVID-19 LAB TEST NON-CDC: HCPCS

## 2023-03-30 PROCEDURE — 99213 OFFICE O/P EST LOW 20 MIN: CPT

## 2023-03-30 NOTE — DISCHARGE INSTRUCTIONS
COVID test is negative. There are no signs of infection on physical exam.  This is likely a viral illness. Please be sure to drink plenty of fluids, use Tylenol and Motrin for pain or fever. Use Flonase and Mucinex for congestion. Please also use a Krupa pot or sinus rinse to help your congestion. If you develop any respiratory complaints, fever that does not improve with medications or any other concerning complaints you should go to the emergency department. Otherwise follow up with your primary care provider.

## 2023-03-30 NOTE — ED INITIAL ASSESSMENT (HPI)
Pt in [address] [place] [address] for c/o sinus issues and head cold x Monday. States having congestion, HA ear pain and teeth pain. No fever.

## 2023-04-26 ENCOUNTER — HOSPITAL ENCOUNTER (EMERGENCY)
Facility: HOSPITAL | Age: 43
Discharge: HOME | End: 2023-04-26
Attending: EMERGENCY MEDICINE
Payer: COMMERCIAL

## 2023-04-26 VITALS
HEIGHT: 69 IN | TEMPERATURE: 98 F | HEART RATE: 59 BPM | DIASTOLIC BLOOD PRESSURE: 78 MMHG | OXYGEN SATURATION: 97 % | RESPIRATION RATE: 17 BRPM | SYSTOLIC BLOOD PRESSURE: 121 MMHG | BODY MASS INDEX: 46.65 KG/M2 | WEIGHT: 315 LBS

## 2023-04-26 DIAGNOSIS — K60.2 ANAL FISSURE: Primary | ICD-10-CM

## 2023-04-26 LAB
ANION GAP SERPL CALC-SCNC: 5 MMOL/L (ref 0–18)
BASOPHILS # BLD AUTO: 0.06 X10(3) UL (ref 0–0.2)
BASOPHILS NFR BLD AUTO: 0.8 %
BUN BLD-MCNC: 12 MG/DL (ref 7–18)
BUN/CREAT SERPL: 16.7 (ref 10–20)
CALCIUM BLD-MCNC: 8.6 MG/DL (ref 8.5–10.1)
CHLORIDE SERPL-SCNC: 109 MMOL/L (ref 98–112)
CO2 SERPL-SCNC: 25 MMOL/L (ref 21–32)
CREAT BLD-MCNC: 0.72 MG/DL
DEPRECATED RDW RBC AUTO: 40.9 FL (ref 35.1–46.3)
EOSINOPHIL # BLD AUTO: 0.1 X10(3) UL (ref 0–0.7)
EOSINOPHIL NFR BLD AUTO: 1.4 %
ERYTHROCYTE [DISTWIDTH] IN BLOOD BY AUTOMATED COUNT: 12.1 % (ref 11–15)
GFR SERPLBLD BASED ON 1.73 SQ M-ARVRAT: 118 ML/MIN/1.73M2 (ref 60–?)
GLUCOSE BLD-MCNC: 99 MG/DL (ref 70–99)
HCT VFR BLD AUTO: 38.8 %
HGB BLD-MCNC: 13.4 G/DL
IMM GRANULOCYTES # BLD AUTO: 0.02 X10(3) UL (ref 0–1)
IMM GRANULOCYTES NFR BLD: 0.3 %
LYMPHOCYTES # BLD AUTO: 2.39 X10(3) UL (ref 1–4)
LYMPHOCYTES NFR BLD AUTO: 32.7 %
MCH RBC QN AUTO: 31.8 PG (ref 26–34)
MCHC RBC AUTO-ENTMCNC: 34.5 G/DL (ref 31–37)
MCV RBC AUTO: 92.2 FL
MONOCYTES # BLD AUTO: 0.81 X10(3) UL (ref 0.1–1)
MONOCYTES NFR BLD AUTO: 11.1 %
NEUTROPHILS # BLD AUTO: 3.92 X10 (3) UL (ref 1.5–7.7)
NEUTROPHILS # BLD AUTO: 3.92 X10(3) UL (ref 1.5–7.7)
NEUTROPHILS NFR BLD AUTO: 53.7 %
OSMOLALITY SERPL CALC.SUM OF ELEC: 288 MOSM/KG (ref 275–295)
PLATELET # BLD AUTO: 208 10(3)UL (ref 150–450)
POTASSIUM SERPL-SCNC: 3.8 MMOL/L (ref 3.5–5.1)
RBC # BLD AUTO: 4.21 X10(6)UL
SODIUM SERPL-SCNC: 139 MMOL/L (ref 136–145)
WBC # BLD AUTO: 7.3 X10(3) UL (ref 4–11)

## 2023-04-26 PROCEDURE — 80048 BASIC METABOLIC PNL TOTAL CA: CPT | Performed by: EMERGENCY MEDICINE

## 2023-04-26 PROCEDURE — 36415 COLL VENOUS BLD VENIPUNCTURE: CPT

## 2023-04-26 PROCEDURE — 82272 OCCULT BLD FECES 1-3 TESTS: CPT

## 2023-04-26 PROCEDURE — 99284 EMERGENCY DEPT VISIT MOD MDM: CPT

## 2023-04-26 PROCEDURE — 99283 EMERGENCY DEPT VISIT LOW MDM: CPT

## 2023-04-26 PROCEDURE — 85025 COMPLETE CBC W/AUTO DIFF WBC: CPT | Performed by: EMERGENCY MEDICINE

## 2023-04-26 RX ORDER — POLYETHYLENE GLYCOL 3350 17 G/17G
17 POWDER, FOR SOLUTION ORAL DAILY PRN
Qty: 12 EACH | Refills: 0 | Status: SHIPPED | OUTPATIENT
Start: 2023-04-26 | End: 2023-05-26

## 2023-04-26 RX ORDER — HYDROCORTISONE 25 MG/G
1 CREAM TOPICAL 2 TIMES DAILY PRN
Qty: 28 G | Refills: 0 | Status: SHIPPED | OUTPATIENT
Start: 2023-04-26

## 2023-04-26 NOTE — ED INITIAL ASSESSMENT (HPI)
Patient here for blood in his stool since Monday. States there was red when he wiped and mixed in the stool, has decreased since but still seeing red in the stool.

## 2023-05-03 ENCOUNTER — TELEPHONE (OUTPATIENT)
Facility: CLINIC | Age: 43
End: 2023-05-03

## 2023-05-03 ENCOUNTER — OFFICE VISIT (OUTPATIENT)
Facility: CLINIC | Age: 43
End: 2023-05-03

## 2023-05-03 VITALS
SYSTOLIC BLOOD PRESSURE: 117 MMHG | HEIGHT: 69 IN | BODY MASS INDEX: 46.65 KG/M2 | HEART RATE: 66 BPM | WEIGHT: 315 LBS | DIASTOLIC BLOOD PRESSURE: 69 MMHG

## 2023-05-03 DIAGNOSIS — K62.89 RECTAL PAIN: ICD-10-CM

## 2023-05-03 DIAGNOSIS — K62.5 RECTAL BLEEDING: Primary | ICD-10-CM

## 2023-05-03 PROCEDURE — 3078F DIAST BP <80 MM HG: CPT | Performed by: NURSE PRACTITIONER

## 2023-05-03 PROCEDURE — 3008F BODY MASS INDEX DOCD: CPT | Performed by: NURSE PRACTITIONER

## 2023-05-03 PROCEDURE — 99203 OFFICE O/P NEW LOW 30 MIN: CPT | Performed by: NURSE PRACTITIONER

## 2023-05-03 PROCEDURE — 3074F SYST BP LT 130 MM HG: CPT | Performed by: NURSE PRACTITIONER

## 2023-05-03 RX ORDER — SODIUM, POTASSIUM,MAG SULFATES 17.5-3.13G
SOLUTION, RECONSTITUTED, ORAL ORAL
Qty: 1 EACH | Refills: 0 | Status: SHIPPED | OUTPATIENT
Start: 2023-05-03

## 2023-05-03 NOTE — PATIENT INSTRUCTIONS
1. Schedule colonoscopy with MAC w/ first avail Dr Justin Dominguez for rectal bleeding and rectal pain    2.  bowel prep from pharmacy (split )    3. Continue all medications prior to procedure    4. Read all bowel prep instructions carefully    5. AVOID seeds, nuts, popcorn, raw fruits and vegetables (cooked is okay) for 2-3 days before procedure    6. You MAY need to go for COVID testing 72 hours before procedure. The testing team will call you a few days before your procedure to discuss with you if testing is required. If you are asked to go for COVID testing and do not completed the test, the procedure cannot be performed. 7. If you start any NEW medication after your visit today, please notify us. Certain medications will need to be held before the procedure, or the procedure cannot be performed.     >>>Please note: if you were prescribed Suprep for the bowel prep and it is too expensive or not covered by insurance, it is okay to substitute  Trilyte (or any similar generic prep). This can be done by notifying the pharmacy or calling our office.

## 2023-05-03 NOTE — TELEPHONE ENCOUNTER
Scheduled for:  Colonoscopy [id number] , [id number]  Provider Name:  Mariel Swenson  Date:  6/13/2023  Location:  [facility]   Sedation:  Mac   Time: 10:30 Am (pt is aware to arrive at 09:30 Am )   Prep: Split dose Suprep  Prep instructions were given to pt in the office, I discuss prep Instructions with patient at the time of the appointment which he verbally understood and given the prep instructions at the time of the appointment  Meds/Allergies Reconciled?:  Physician reviewed     Diagnosis with codes:  Rectal bleeding K62.5 , Rectal pain K62.89  Was patient informed to call insurance with codes (Y/N):  Yes, I confirmed [employer] [place] insurance with the patient. The patient also verbally understands to call his insurance to check for pre-cert, codes were given on prep instructions. Referral sent?:  Referral was sent at the time of electronic surgical scheduling. [facility] or [id number] notified?:  I sent an electronic request to Endo Scheduling and received a confirmation today. Medication Orders: This patient verbally confirmed that he is not taking:   Iron, blood thinners, BP meds, and is not diabetic   Not latex allergy, Not PCN allergy and does not have a pacemaker Pt is aware to NOT take iron pills, herbal meds and diet supplements for 7 days before exam. Also to NOT take any form of alcohol, recreational drugs and any forms of ED meds 24 hours before exam.    Misc Orders:  Patient verbally understood & will await a phone call from [facility] to schedule.       Further instructions given by staff:

## 2023-06-08 ENCOUNTER — TELEPHONE (OUTPATIENT)
Facility: CLINIC | Age: 43
End: 2023-06-08

## 2023-06-08 NOTE — TELEPHONE ENCOUNTER
We can add the diagnosis but this is a colonoscopy for rectal bleeding and he was actually admitted. Technically there is no such thing as screening for a 40 yo patient - screening starts at age 39.

## 2023-06-08 NOTE — TELEPHONE ENCOUNTER
Lalita Darling with you on 05/03/2023    I spoke to the pt and he states his colonoscopy will cost more if it's not coded as screening    I told the pt we can't remove the codes that are already there but I would ask you if we can add Z12.11 to the other codes    Pt is scheduled on 06/13/2023

## 2023-06-08 NOTE — TELEPHONE ENCOUNTER
Per Gladys Smith, the code  Z12.11 cannot be added to a pt with an age under 39 for a colonoscopy.     The codes of rectal bleeding and rectal pain need to be used    I called the pt to let him know and he voices understanding

## 2023-06-09 ENCOUNTER — TELEPHONE (OUTPATIENT)
Facility: CLINIC | Age: 43
End: 2023-06-09

## 2023-06-09 NOTE — TELEPHONE ENCOUNTER
Canceled per patient  for:  Colonoscopy [id number] , [id number]  Provider Name:  Mayra Rose  Date:  6/13/2023  Location:  [facility]   Sedation:  Mac   Time: 10:30 Am (pt is aware to arrive at 09:30 Am )   Prep: Split dose Suprep

## 2023-06-23 DIAGNOSIS — I10 HYPERTENSION, UNSPECIFIED TYPE: ICD-10-CM

## 2023-06-25 RX ORDER — LOSARTAN POTASSIUM 50 MG/1
50 TABLET ORAL DAILY
Qty: 90 TABLET | Refills: 0 | Status: SHIPPED | OUTPATIENT
Start: 2023-06-25

## 2023-10-30 ENCOUNTER — OFFICE VISIT (OUTPATIENT)
Dept: INTERNAL MEDICINE CLINIC | Facility: CLINIC | Age: 43
End: 2023-10-30

## 2023-10-30 VITALS
DIASTOLIC BLOOD PRESSURE: 80 MMHG | WEIGHT: 315 LBS | HEIGHT: 69 IN | HEART RATE: 88 BPM | SYSTOLIC BLOOD PRESSURE: 122 MMHG | BODY MASS INDEX: 46.65 KG/M2 | OXYGEN SATURATION: 97 %

## 2023-10-30 DIAGNOSIS — Z00.00 PHYSICAL EXAM: Primary | ICD-10-CM

## 2023-10-30 DIAGNOSIS — R53.83 OTHER FATIGUE: ICD-10-CM

## 2023-10-30 DIAGNOSIS — I10 HYPERTENSION, UNSPECIFIED TYPE: ICD-10-CM

## 2023-10-30 DIAGNOSIS — R06.83 SNORING: ICD-10-CM

## 2023-10-30 PROCEDURE — 99396 PREV VISIT EST AGE 40-64: CPT | Performed by: FAMILY MEDICINE

## 2023-10-30 PROCEDURE — 3074F SYST BP LT 130 MM HG: CPT | Performed by: FAMILY MEDICINE

## 2023-10-30 PROCEDURE — 3079F DIAST BP 80-89 MM HG: CPT | Performed by: FAMILY MEDICINE

## 2023-10-30 PROCEDURE — 90471 IMMUNIZATION ADMIN: CPT | Performed by: FAMILY MEDICINE

## 2023-10-30 PROCEDURE — 3008F BODY MASS INDEX DOCD: CPT | Performed by: FAMILY MEDICINE

## 2023-10-30 PROCEDURE — 90715 TDAP VACCINE 7 YRS/> IM: CPT | Performed by: FAMILY MEDICINE

## 2023-10-30 RX ORDER — LOSARTAN POTASSIUM 50 MG/1
50 TABLET ORAL DAILY
Qty: 90 TABLET | Refills: 3 | Status: SHIPPED | OUTPATIENT
Start: 2023-10-30

## 2023-10-30 NOTE — PATIENT INSTRUCTIONS
Recommendations on weight loss:  - Drink 8 glasses of water a day  - Do not drink your calories ( no regular pop, juice, high calorie coffee drinks, limit alcohol)  - Eat high protein meals and snacks  - Don't deprive yourself of food you like, just limit amount and make smart choices  - Write down everything you eat for a few days- right away and think about why you are eating ( are you hungry, or are you eating because you are bored, tired, etc)- to get back on track or use Lose it Nusrat  - Do not eat late at night  - Exercise- aim for 30 minutes 5 times a week of cardiac activity. Also strength training with light weights is helpful  - Weight yourself once a week first thing in the morning    Food advice:    1. Cut down your sugar consumption  2. Cut down refined grains/ carbs  3. Eat a good amount of protein and natural fats ( lean meats/avocado)  4. Increase natural fiber ( fruits/veggies)    Use nusrat LOSE IT or MY FITNESS PAL    Also consider weight watchers    [employer] resource: [employer]    Good books: The Robles Diet Solution ( helps with tips to stay motivated)  The Obesity Code and The Complete Guide to Intermittent Fasting - both about fasting and by Dr. Yue Hwang:  Emelia Li- looks at labels.    EAT HIGH PROTEIN TO FILL YOU UP  AND FOODS HIGH IN FIBER  EAT LESS PROCESSED FOODS/ MORE CLEAN EATING- this makes those two ideas above easier  EXERCISE FOR MOOD/ SLEEP/ENERGY / YOUR HEART/ AND HELP WITH WEIGHT LOSS

## 2023-12-16 ENCOUNTER — LAB ENCOUNTER (OUTPATIENT)
Dept: LAB | Age: 43
End: 2023-12-16
Attending: FAMILY MEDICINE
Payer: COMMERCIAL

## 2023-12-16 DIAGNOSIS — Z00.00 PHYSICAL EXAM: ICD-10-CM

## 2023-12-16 LAB
ALBUMIN SERPL-MCNC: 4.1 G/DL (ref 3.2–4.8)
ALBUMIN/GLOB SERPL: 1.3 {RATIO} (ref 1–2)
ALP LIVER SERPL-CCNC: 81 U/L
ALT SERPL-CCNC: 22 U/L
ANION GAP SERPL CALC-SCNC: 7 MMOL/L (ref 0–18)
AST SERPL-CCNC: 20 U/L (ref ?–34)
BASOPHILS # BLD AUTO: 0.09 X10(3) UL (ref 0–0.2)
BASOPHILS NFR BLD AUTO: 1.3 %
BILIRUB SERPL-MCNC: 0.6 MG/DL (ref 0.3–1.2)
BUN BLD-MCNC: 13 MG/DL (ref 9–23)
BUN/CREAT SERPL: 16.7 (ref 10–20)
CALCIUM BLD-MCNC: 9 MG/DL (ref 8.7–10.4)
CHLORIDE SERPL-SCNC: 106 MMOL/L (ref 98–112)
CHOLEST SERPL-MCNC: 170 MG/DL (ref ?–200)
CO2 SERPL-SCNC: 26 MMOL/L (ref 21–32)
CREAT BLD-MCNC: 0.78 MG/DL
DEPRECATED RDW RBC AUTO: 42 FL (ref 35.1–46.3)
EGFRCR SERPLBLD CKD-EPI 2021: 114 ML/MIN/1.73M2 (ref 60–?)
EOSINOPHIL # BLD AUTO: 0.1 X10(3) UL (ref 0–0.7)
EOSINOPHIL NFR BLD AUTO: 1.5 %
ERYTHROCYTE [DISTWIDTH] IN BLOOD BY AUTOMATED COUNT: 12.5 % (ref 11–15)
EST. AVERAGE GLUCOSE BLD GHB EST-MCNC: 94 MG/DL (ref 68–126)
FASTING PATIENT LIPID ANSWER: NO
FASTING STATUS PATIENT QL REPORTED: NO
GLOBULIN PLAS-MCNC: 3.1 G/DL (ref 2.8–4.4)
GLUCOSE BLD-MCNC: 100 MG/DL (ref 70–99)
HBA1C MFR BLD: 4.9 % (ref ?–5.7)
HCT VFR BLD AUTO: 40.6 %
HDLC SERPL-MCNC: 35 MG/DL (ref 40–59)
HGB BLD-MCNC: 14.1 G/DL
IMM GRANULOCYTES # BLD AUTO: 0.02 X10(3) UL (ref 0–1)
IMM GRANULOCYTES NFR BLD: 0.3 %
IRON SATN MFR SERPL: 32 %
IRON SERPL-MCNC: 102 UG/DL
LDLC SERPL CALC-MCNC: 113 MG/DL (ref ?–100)
LYMPHOCYTES # BLD AUTO: 2.8 X10(3) UL (ref 1–4)
LYMPHOCYTES NFR BLD AUTO: 40.9 %
MCH RBC QN AUTO: 32 PG (ref 26–34)
MCHC RBC AUTO-ENTMCNC: 34.7 G/DL (ref 31–37)
MCV RBC AUTO: 92.3 FL
MONOCYTES # BLD AUTO: 0.68 X10(3) UL (ref 0.1–1)
MONOCYTES NFR BLD AUTO: 9.9 %
NEUTROPHILS # BLD AUTO: 3.15 X10 (3) UL (ref 1.5–7.7)
NEUTROPHILS # BLD AUTO: 3.15 X10(3) UL (ref 1.5–7.7)
NEUTROPHILS NFR BLD AUTO: 46.1 %
NONHDLC SERPL-MCNC: 135 MG/DL (ref ?–130)
OSMOLALITY SERPL CALC.SUM OF ELEC: 288 MOSM/KG (ref 275–295)
PLATELET # BLD AUTO: 281 10(3)UL (ref 150–450)
POTASSIUM SERPL-SCNC: 4.3 MMOL/L (ref 3.5–5.1)
PROT SERPL-MCNC: 7.2 G/DL (ref 5.7–8.2)
RBC # BLD AUTO: 4.4 X10(6)UL
SODIUM SERPL-SCNC: 139 MMOL/L (ref 136–145)
TIBC SERPL-MCNC: 316 UG/DL (ref 250–425)
TRANSFERRIN SERPL-MCNC: 212 MG/DL (ref 215–365)
TRIGL SERPL-MCNC: 123 MG/DL (ref 30–149)
VLDLC SERPL CALC-MCNC: 21 MG/DL (ref 0–30)
WBC # BLD AUTO: 6.8 X10(3) UL (ref 4–11)

## 2023-12-16 PROCEDURE — 80053 COMPREHEN METABOLIC PANEL: CPT | Performed by: FAMILY MEDICINE

## 2023-12-16 PROCEDURE — 83036 HEMOGLOBIN GLYCOSYLATED A1C: CPT | Performed by: FAMILY MEDICINE

## 2023-12-16 PROCEDURE — 80061 LIPID PANEL: CPT | Performed by: FAMILY MEDICINE

## 2023-12-16 PROCEDURE — 83540 ASSAY OF IRON: CPT | Performed by: FAMILY MEDICINE

## 2023-12-16 PROCEDURE — 85025 COMPLETE CBC W/AUTO DIFF WBC: CPT | Performed by: FAMILY MEDICINE

## 2023-12-16 PROCEDURE — 84466 ASSAY OF TRANSFERRIN: CPT | Performed by: FAMILY MEDICINE

## 2024-02-14 ENCOUNTER — HOSPITAL ENCOUNTER (OUTPATIENT)
Age: 44
Discharge: HOME | End: 2024-02-14
Payer: COMMERCIAL

## 2024-02-14 VITALS
OXYGEN SATURATION: 98 % | SYSTOLIC BLOOD PRESSURE: 118 MMHG | TEMPERATURE: 98 F | HEART RATE: 93 BPM | DIASTOLIC BLOOD PRESSURE: 77 MMHG | RESPIRATION RATE: 18 BRPM

## 2024-02-14 DIAGNOSIS — J01.10 ACUTE NON-RECURRENT FRONTAL SINUSITIS: Primary | ICD-10-CM

## 2024-02-14 PROCEDURE — 99213 OFFICE O/P EST LOW 20 MIN: CPT | Performed by: NURSE PRACTITIONER

## 2024-02-14 RX ORDER — AMOXICILLIN AND CLAVULANATE POTASSIUM 875; 125 MG/1; MG/1
1 TABLET, FILM COATED ORAL 2 TIMES DAILY
Qty: 20 TABLET | Refills: 0 | Status: SHIPPED | OUTPATIENT
Start: 2024-02-14 | End: 2024-02-24

## 2024-02-14 NOTE — ED PROVIDER NOTES
Patient Seen in: [facility]      History     Chief Complaint   Patient presents with    Sinus Problem     Stated Complaint: head cold    Subjective:   HPI    43-year-old male presents with frontal sinusitis.  Increasing phlegm/sinus drainage since 2024.    Objective:   Past Medical History:   Diagnosis Date    Allergic rhinitis     Essential hypertension     Fracture of right fibula     per [employer]:     High blood pressure               History reviewed. No pertinent surgical history.             Social History     Socioeconomic History    Marital status:    Tobacco Use    Smoking status: Former     Packs/day: 1.00     Years: 10.00     Additional pack years: 0.00     Total pack years: 10.00     Types: Cigarettes     Quit date: 2016     Years since quittin.8    Smokeless tobacco: Current    Tobacco comments:     No longer smoke ciggarettes, but still use electronic vape pens   Vaping Use    Vaping Use: Every day    Substances: Nicotine   Substance and Sexual Activity    Alcohol use: Yes     Comment: Social    Drug use: No    Sexual activity: Not Currently   Other Topics Concern    Caffeine Concern Yes     Comment: soda    Reaction to local anesthetic No              Review of Systems    Positive for stated complaint: head cold  Other systems are as noted in HPI.  Constitutional and vital signs reviewed.      All other systems reviewed and negative except as noted above.    Physical Exam     ED Triage Vitals [24 1659]   /77   Pulse 93   Resp 18   Temp 98 °F (36.7 °C)   Temp src Temporal   SpO2 98 %   O2 Device None (Room air)       Current:/77   Pulse 93   Temp 98 °F (36.7 °C) (Temporal)   Resp 18   SpO2 98%         Physical Exam  Vitals and nursing note reviewed.   Constitutional:       Appearance: He is obese.   HENT:      Head: Normocephalic.      Right Ear: Tympanic membrane normal.      Left Ear: Tympanic membrane normal.      Nose: Congestion  and rhinorrhea present.      Mouth/Throat:      Mouth: Mucous membranes are moist.   Eyes:      Pupils: Pupils are equal, round, and reactive to light.   Cardiovascular:      Rate and Rhythm: Normal rate and regular rhythm.   Pulmonary:      Effort: Pulmonary effort is normal. No respiratory distress.      Breath sounds: No wheezing.   Abdominal:      General: There is no distension.      Tenderness: There is no abdominal tenderness.   Musculoskeletal:         General: Normal range of motion.      Cervical back: Normal range of motion.   Skin:     General: Skin is warm and dry.   Neurological:      Mental Status: He is alert.               ED Course   Labs Reviewed - No data to display                   MDM                                         Medical Decision Making  43 year old male with here for sinusitis . Well-appearing, well-hydrated on exam. No SBI identified on exam. Likely viral illness, URI. As most sinus infections are viral discussed not taking antibiotics for this problem. Pt requesting antibiotics r/t length of time. Again discussed this may last longer than a few weeks, an additional 6-8 weeks for viral prodromes.       Plan:   - home with supportive care  - tylenol, motrin prn  - f/u with PCP    Physical exam remained stable over serial reexaminations as previously documented.      I have discussed with the patient the results of tests, differential diagnosis, and warning signs and symptoms that should prompt immediate return.  The patient understands these instructions and agrees to the follow-up plan provided.  There are no barriers to learning.   Appropriate f/u given.  Patient agrees to return for any concerns/problems/complications.        q    Disposition and Plan     Clinical Impression:  1. Acute non-recurrent frontal sinusitis         Disposition:  Discharge  2/14/2024  5:31 pm    Follow-up:  Lalitha Cruz MD  [address] [place] [address]  [id number]  [phone]                Medications Prescribed:  Discharge Medication List as of 2/14/2024  5:33 PM        START taking these medications    Details   amoxicillin clavulanate 875-125 MG Oral Tab Take 1 tablet by mouth 2 (two) times daily for 10 days., Normal, Disp-20 tablet, R-0

## 2024-02-14 NOTE — DISCHARGE INSTRUCTIONS
I sent an antibiotic to your pharmacy. Please continue to use flonase daily for nasal congestion.     There is also a chance you can develop rash after using antibiotics for a viral infection. You may also experience diarrhea, dizziness, weakness. Please follow with your primary care doctor    Some sinus infections are related to viruses. Viral infections do not respond to antibiotics and are treated symptomatically. Ensure to rest and maintain hydration. Viral infections can progress and can lead to bacterial infections. If you develop any new, progressing, changing, or worsening signs or symptoms, please present immediately to your primary care physician for reassessment. If you develop any difficulty breathing, chest pain, shortness of breath, difficulty swallowing, drooling, signs or symptoms of dehydration, or any other concerning symptoms, call 911 or present immediately to the emergency department.

## 2024-02-14 NOTE — ED INITIAL ASSESSMENT (HPI)
Pt with sinus pressure, congestion, excess mucus and phlegm. States symptoms have been persisting for 2 weeks. Denies fever

## 2024-03-13 NOTE — PROGRESS NOTES
CC:  No chief complaint on file.      Hx of CC:    F/u HTN  F/u weight  Started getting up earlier every day to exercise before work- boxing classes in am  Also started intermittent fasting and lost 8 lbs since he started that   Eating Doing 11-7pm  Eating better  also    Eating issues:   Grazer  Portions are too big and snacking  Craves sugar at night  Unsure if gets really hungry      No focus issues  No anxiety/ depression    B yogurt and granola  L- burger/ pizza/ out  D pasta, tacos, fish  Snack- trail mix  Sweet at night  Water  Sparkling ice water  Social alcohol- 2-6 drinks a week      Getting checked for sleep apnea- saw pulm today    Has problems breathing in his nose  Uses flonase  Always feels congested  Takes zyrtec prn     Allergies:  No Known Allergies   Current Meds:  Current Outpatient Medications   Medication Sig Dispense Refill    losartan 50 MG Oral Tab Take 1 tablet (50 mg total) by mouth daily. 90 tablet 3    montelukast (SINGULAIR) 10 MG Oral Tab Take 1 tablet (10 mg total) by mouth nightly. 90 tablet 1    fluticasone propionate (FLONASE) 50 MCG/ACT Nasal Suspension 2 sprays by Nasal route daily. 3 each 2    methylPREDNISolone 4 MG Oral Tablet Therapy Pack Take as directed with food 21 tablet 0    Levocetirizine Dihydrochloride (XYZAL) 5 MG Oral Tab Take 1 tablet (5 mg total) by mouth nightly. 90 tablet 1        History:  Past Medical History:   Diagnosis Date    Allergic rhinitis     Essential hypertension     Fracture of right fibula 2011    per [employer]:     High blood pressure       No past surgical history on file.   Family History   Problem Relation Age of Onset    Asthma Mother     Allergies Mother     Cancer Maternal Grandfather       Family Status   Relation Status    Mo (Not Specified)    MGFA (Not Specified)      Social History     Socioeconomic History    Marital status:    Tobacco Use    Smoking status: Former     Packs/day: 1.00     Years: 10.00     Additional pack years:  0.00     Total pack years: 10.00     Types: Cigarettes     Quit date: 2016     Years since quittin.9    Smokeless tobacco: Current    Tobacco comments:     No longer smoke ciggarettes, but still use electronic vape pens   Vaping Use    Vaping Use: Every day    Substances: Nicotine   Substance and Sexual Activity    Alcohol use: Yes     Comment: Social    Drug use: No    Sexual activity: Not Currently   Other Topics Concern    Caffeine Concern Yes     Comment: soda    Reaction to local anesthetic No            ROS:  General:  No fever, no fatigue, no weight changes  HEENT:  Denies congestion or nasal discharge  Cardio:  No chest pain   Pulmonary:  No cough, no SOB  GI:  No N/V/D  Dermatologic:  No rashes    Physical:    There were no vitals taken for this visit.    General:  Alert, appropriate, no acute distress  HEENT:  Normocephalic, supple. Moist mucus membranes.   Nose- not noted deviated septum   Cardio:  RRR, no murmurs, S1, S2  Pulmonary:  Clear bilaterally, good air entry  Dermatologic:  No rashes or lesions  EXT: no edema  MS: normal movement   NEURO: no gross deficits     Assessment and Plan:    1. Obesity, morbid, BMI 50 or higher (HCC)  Pt counseled on importance of weight loss and exercise. Recommend 30 min of cardio activity 5 times a week. Advised small high protein meals and snacks throughout day. Avoid carbs and sugars. Increase water and not to drink liquid calories. Pt given printed info on weight loss recommendations.   Pt is thinking about starting weight loss medication .  I did send in phentermine. Discussed risks and benefits. Needs to continue diet and exercise for this to work. Follow up 2 month. If having side effects such as palpitations, pt to stop med and call office.   His blood pressure has been well controlled  If wants to take med will need to get updated EKG- last one was normal in 2020.  Work on better food choices, more protein, less fast food, more meal planning and less  grazing  Get sleep study done         - Phentermine HCl 15 MG Oral Cap; Take 1 capsule (15 mg total) by mouth every morning.  Dispense: 30 capsule; Refill: 1  - EKG 12 Lead to be performed at [facility]; Future    2. Nasal congestion    - ENT Referral - In Network    3. Hypertension, unspecified type  Controlled CPM     There are no diagnoses linked to this encounter.  None  No orders of the defined types were placed in this encounter.

## 2024-03-14 ENCOUNTER — OFFICE VISIT (OUTPATIENT)
Dept: PULMONOLOGY | Facility: CLINIC | Age: 44
End: 2024-03-14

## 2024-03-14 ENCOUNTER — OFFICE VISIT (OUTPATIENT)
Dept: INTERNAL MEDICINE CLINIC | Facility: CLINIC | Age: 44
End: 2024-03-14
Payer: COMMERCIAL

## 2024-03-14 VITALS
RESPIRATION RATE: 14 BRPM | BODY MASS INDEX: 46.65 KG/M2 | OXYGEN SATURATION: 97 % | WEIGHT: 315 LBS | SYSTOLIC BLOOD PRESSURE: 123 MMHG | DIASTOLIC BLOOD PRESSURE: 77 MMHG | HEIGHT: 69 IN | HEART RATE: 79 BPM

## 2024-03-14 VITALS
BODY MASS INDEX: 46.65 KG/M2 | HEART RATE: 74 BPM | WEIGHT: 315 LBS | HEIGHT: 69 IN | OXYGEN SATURATION: 97 % | DIASTOLIC BLOOD PRESSURE: 76 MMHG | SYSTOLIC BLOOD PRESSURE: 120 MMHG

## 2024-03-14 DIAGNOSIS — E66.01 OBESITY, MORBID, BMI 50 OR HIGHER (HCC): Primary | ICD-10-CM

## 2024-03-14 DIAGNOSIS — R09.81 NASAL CONGESTION: ICD-10-CM

## 2024-03-14 DIAGNOSIS — Z72.0 CURRENT EVERY DAY NICOTINE VAPING: ICD-10-CM

## 2024-03-14 DIAGNOSIS — I10 HYPERTENSION, UNSPECIFIED TYPE: ICD-10-CM

## 2024-03-14 DIAGNOSIS — R29.818 SUSPECTED SLEEP APNEA: Primary | ICD-10-CM

## 2024-03-14 PROCEDURE — 3078F DIAST BP <80 MM HG: CPT | Performed by: PHYSICIAN ASSISTANT

## 2024-03-14 PROCEDURE — 99203 OFFICE O/P NEW LOW 30 MIN: CPT | Performed by: PHYSICIAN ASSISTANT

## 2024-03-14 PROCEDURE — 3074F SYST BP LT 130 MM HG: CPT | Performed by: PHYSICIAN ASSISTANT

## 2024-03-14 PROCEDURE — 3074F SYST BP LT 130 MM HG: CPT | Performed by: FAMILY MEDICINE

## 2024-03-14 PROCEDURE — 3078F DIAST BP <80 MM HG: CPT | Performed by: FAMILY MEDICINE

## 2024-03-14 PROCEDURE — 3008F BODY MASS INDEX DOCD: CPT | Performed by: FAMILY MEDICINE

## 2024-03-14 PROCEDURE — 99214 OFFICE O/P EST MOD 30 MIN: CPT | Performed by: FAMILY MEDICINE

## 2024-03-14 PROCEDURE — 3008F BODY MASS INDEX DOCD: CPT | Performed by: PHYSICIAN ASSISTANT

## 2024-03-14 RX ORDER — PHENTERMINE HYDROCHLORIDE 15 MG/1
15 CAPSULE ORAL EVERY MORNING
Qty: 30 CAPSULE | Refills: 1 | Status: SHIPPED | OUTPATIENT
Start: 2024-03-14

## 2024-03-14 NOTE — PATIENT INSTRUCTIONS
Recommendations on weight loss:    - Drink 8 glasses of water a day/ 64 oz  - Do not drink your calories ( no regular pop, juice, high calorie coffee drinks, limit alcohol)  - Eat high protein meals and snacks- aim for 15-30 gm of protein / meal and chose snacks that are high in protein ( ex hard boiled eggs, string cheese, cottage cheese, greek yogurt. Natural fats  and lean meats are also a good source of protein.  Limit carbs to 100 gm/ day. When choosing carbs chose healthy carbs ( fruit/ veggies/ whole grain options/ sweet potatoes). Dietdoctor.com is good resource for this.  - Don't deprive yourself of food you like, just limit amount and make smart choices  - Write down everything you eat for a few days- right away and think about why you are eating ( are you hungry, or are you eating because you are bored, tired, etc)- to get back on track or use Lose it Nusrat  - Do not eat late at night  - Exercise- aim for 30 minutes 5 times a week of cardiac activity. Also strength training 2-3 times a week  30 minutes 5 times a week is recommended for weight maintenance, but for weight loss the goal is 300 minutes per/ week   - Weight yourself once a week first thing in the morning  -start taking fiber supplement daily- ex psyllium ( ex citracel, metamucil or generic psyllium ( can get at [employer] ex)). This helps keep stools regular, helps you feel full and can be good for the heart. Also increasing fiber in your diet is helpful.   Work on stress and increased sleep        1. Cut down your sugar consumption  2. Cut down refined grains/ carbs  3. Eat a good amount of protein and natural fats ( lean meats/avocado)  4. Increase natural fiber ( fruits/veggies) 5-6 servings / day      Nutritional Goals :           Calorie-controlled diet:  approx 1500 khushbu ( may be more or less depending on exercise), Limit carbohydrates to <100 gms per day, Protein goal of 100 gms per day.  Increase fiber to 25-35g     Use nusrat LOSE IT or MY  FITNESS PAL to track calories  Goal to lose 1.5-2 lbs/ week     Take time to shop, read labels, plan healthy meals and snacks on the goal.  Protein shakes may help- example Premier protein or Orgain plant protein shake       Also consider weight watchers    Great resource: dietdoctor.com    Good books:   The Robles Diet Solution ( helps with tips to stay motivated)  The Obesity Code and The Complete Guide to Intermittent Fasting - both about fasting and by Dr. Ever Ramos    Good recipes: [employer] blog     Podcast : strong as a working mom- Katia Lauren MD. You don't need to be a working mom to find some of these tips helpful.    MY BEST ADVICE:  EAT LOW CARB AND SUGAR- looks at labels.   EAT HIGH PROTEIN TO FILL YOU UP  AND FOODS HIGH IN FIBER  EAT LESS PROCESSED FOODS/ MORE CLEAN EATING- this makes those two ideas above easier  EXERCISE FOR MOOD/ SLEEP/ENERGY / YOUR HEART/ AND HELP WITH WEIGHT LOSS . GET GOOD SLEEP.    IF YOU HAVE A BAD DAY, DON'T BEAT YOURSELF UP AND LOSE CONTROL. JUST GET BACK ON TRACK.

## 2024-03-14 NOTE — PROGRESS NOTES
Pulmonary Consult Note    History of Present Illness:  Johnny Santos is a 43 year old male presenting to pulmonary clinic today for suspected sleep disordered breathing, referred by PCP Dr. Cruz. The patient describes unrefreshing sleep and daytime fatigue occasionally requiring daily afternoon naps. There is snoring. His wife has told him it appears he stops breathing while sleeping. He goes to bed at 9-10 PM, falls asleep promptly, and awakens at 5 AM. There are frequent nocturnal awakenings. There are no AM headaches. There is no drowsy driving. There is no depression. There is no urge to move the limbs, cataplexy, hypnagogic hallucinations, or sleep paralysis. [place] Sleepiness Scale score is 9/24. The patient is former smoker, quit 7 years ago after having smoked 1 ppd for 17 years. He vapes nicotine daily for the last 5 years and is not yet ready to quit.    Past Medical History: Allergic rhinitis, obesity, hypertension    Past Surgical History: None    Family Medical History: Mother alive and well, father alive with hemochromatosis    Social History: , no kids, works as   -Tobacco: Former smoker, quit 7 years ago after having smoked 1 ppd for 17 years  -Other: Vapes disposable nicotine daily for 5 years  -Alcohol: Occasional    Allergies: Patient has no known allergies.     Medications: Losartan, fluticasone propionate, levocetirizine    Review of Systems:   Constitutional: No weight loss or weight gain.  HEENT: +Chronic nasal congestion.  Cardio: No chest pain.  Respiratory: No shortness of breath.  GI: No acid reflux.  Extremities: No lower extremity swelling or pain.  Neurologic: No headache.  Psych: No depression.     Physical Exam:  /77   Pulse 79   Resp 14   Ht 5' 9\" (1.753 m)   Wt (!) 340 lb (154.2 kg)   SpO2 97%   BMI 50.21 kg/m²    Constitutional: Morbidly obese. No acute distress.  HEENT: Head NC/AT. PEERL. Crowded oropharynx. Mallampati class 3.  Cardio:  Regular rate and rhythm. Normal S1 and S2. No murmurs.   Respiratory: Thorax symmetrical with no labored breathing. Clear to ausculation bilaterally with symmetrical breath sounds. No wheezing, rhonchi, or crackles.   Extremities: No clubbing or cyanosis. No LE edema. No calf tenderness.  Neurologic: A&Ox3. No gross motor deficits.  Skin: Warm, dry.  Lymphatic: No cervical or supraclavicular lymphadenopathy.  Psych: Pleasant affect. Cooperative.    Assessment/Plan:  Suspected EVELIN - High clinical suspicion for EVELIN in patient with increased BMI, snoring, and witnessed apneic events. Discussed risks of untreated EVELIN including increased risk of cardiovascular and cerebrovascular events.  Plan:   -Home sleep study  -Weight loss  -Avoid alcohol  -Avoid sedating drugs  -Never drive if sleepy  -Follow-up contingent on results of above    Nicotine vaping - Daily and not ready to quit. Cessation counseling provided.  Plan:  -Recommend cessation of nicotine vaping and encouraged patient to follow up if he would like assistance    Robin Coker PA-C  Pulmonary Medicine  3/14/2024

## 2024-03-14 NOTE — PATIENT INSTRUCTIONS
Home sleep study: [phone]    Recommend trying to quit vaping. If you would like assistance, please let me know as we discussed in office today.

## 2024-05-08 ENCOUNTER — OFFICE VISIT (OUTPATIENT)
Dept: SLEEP CENTER | Age: 44
End: 2024-05-08
Attending: PHYSICIAN ASSISTANT
Payer: COMMERCIAL

## 2024-05-08 DIAGNOSIS — R29.818 SUSPECTED SLEEP APNEA: Primary | ICD-10-CM

## 2024-05-08 PROCEDURE — 95806 SLEEP STUDY UNATT&RESP EFFT: CPT

## 2024-05-10 NOTE — PROCEDURES
[facility]  Accredited by the American Academy of Sleep Medicine (AASM)    PATIENT'S NAME: CAPRI ALVARADO   ATTENDING PHYSICIAN: Lalitha Cruz MD   REFERRING PHYSICIAN: Robin Coker PA-C   PATIENT ACCOUNT #: [id number] LOCATION: Sleep Center   MEDICAL RECORD #: [id number] YOB: 1980   DATE OF STUDY: 05/08/2024       SLEEP STUDY REPORT    STUDY TYPE:  Home sleep test.    INDICATION:  Suspected obstructive sleep apnea (ICD-10 code G47.33), in a patient with snoring, hypersomnia, hypertension, unrefreshing sleep, daytime fatigue, nocturnal awakenings, an [place] Sleepiness Scale score of 6/24, and a body mass index of 50.4.    RESULTS:  The patient underwent home sleep test with measurement of his nasal airflow, nasal air pressure, snoring, chest and abdominal wall motion, oximetry, and body position.  I have reviewed the entirety of the raw data of this study.  During the study, the total recording time is 465 minutes.  The lights-out clock time is 10:25 p.m.; the lights on clock time is 6:11 a.m.  The apnea plus hypopnea index is 4.2 events per hour, and the supine apnea plus hypopnea index is 7.6 events per hour.  The average oxygen saturation is 94%, the lowest oxygen saturation is 89%, and the patient spent 0% of the test with saturations 88% or less.  The average heart rate was 73 beats per minute, and the patient spent approximately 10% of the test in the supine position.    INTERPRETATION:  The data generated from this study shows no evidence of significant sleep-disordered breathing (ICD-10 code G47.33).    RECOMMENDATIONS:    1.   Clinical correlation is required.    2.   Weight loss.    3.   Avoid alcohol.    4.   Avoid sedating drug.    5.   Patient should not drive if at all sleepy.      Please do not hesitate to contact me if there is any question whatsoever regarding interpretation of this study.    Dictated By Camacho Chávez MD  d: 05/10/2024  17:28:15  t: 05/10/2024 17:45:34  [facility] [id number]  [facility]/    cc: GRAZYNA Doan MD

## 2024-06-11 NOTE — PROGRESS NOTES
CC:  Follow - Up      Hx of CC:    F/u HTN  F/u weight  Rx phentermine 15 mg took for 2 mos but out of it now  Didn't help a lot, but has had stress and not working on diet and exercise.   Not exercising a lot b/c moving and packing    + lots of stress  Bought a house  Doing fertility    Issues are Portion control issue, grazing and eating out of boredom   Struggles with motivation but not depressed     No focus issues  No anxiety/ depression      Notes last visit:   B yogurt and granola  L- burger/ pizza/ out  D pasta, tacos, fish  Snack- trail mix  Sweet at night  Water  Sparkling ice water  Social alcohol- 2-6 drinks a week    Has sleep apnea    Allergies:  No Known Allergies   Current Meds:  Current Outpatient Medications   Medication Sig Dispense Refill    Phentermine HCl 30 MG Oral Cap Take 1 capsule (30 mg total) by mouth every morning. 30 capsule 1    Phentermine HCl 15 MG Oral Cap Take 1 capsule (15 mg total) by mouth every morning. 30 capsule 1    losartan 50 MG Oral Tab Take 1 tablet (50 mg total) by mouth daily. 90 tablet 3    fluticasone propionate (FLONASE) 50 MCG/ACT Nasal Suspension 2 sprays by Nasal route daily. 3 each 2    Levocetirizine Dihydrochloride (XYZAL) 5 MG Oral Tab Take 1 tablet (5 mg total) by mouth nightly. 90 tablet 1    montelukast (SINGULAIR) 10 MG Oral Tab Take 1 tablet (10 mg total) by mouth nightly. (Patient not taking: Reported on 3/14/2024) 90 tablet 1    methylPREDNISolone 4 MG Oral Tablet Therapy Pack Take as directed with food (Patient not taking: Reported on 3/14/2024) 21 tablet 0        History:  Past Medical History:    Allergic rhinitis    Essential hypertension    Fracture of right fibula    per [employer]:     High blood pressure      No past surgical history on file.   Family History   Problem Relation Age of Onset    Asthma Mother     Allergies Mother     Cancer Maternal Grandfather       Family Status   Relation Status    Mo (Not Specified)    MGFA (Not Specified)       Social History     Socioeconomic History    Marital status:    Tobacco Use    Smoking status: Former     Current packs/day: 0.00     Average packs/day: 1 pack/day for 17.0 years (17.0 ttl pk-yrs)     Types: Cigarettes     Start date: 1999     Quit date: 2016     Years since quittin.1    Smokeless tobacco: Never    Tobacco comments:     No longer smoke ciggarettes, but still use electronic vape pens   Vaping Use    Vaping status: Every Day    Substances: Nicotine, Flavoring    Devices: Disposable   Substance and Sexual Activity    Alcohol use: Yes     Comment: Social    Drug use: No    Sexual activity: Not Currently   Other Topics Concern    Caffeine Concern Yes     Comment: soda    Reaction to local anesthetic No            ROS:  General:  No fever, no fatigue, no weight changes  HEENT:  Denies congestion or nasal discharge  Cardio:  No chest pain   Pulmonary:  No cough, no SOB  GI:  No N/V/D  Dermatologic:  No rashes    Physical:    /72   Pulse 84   Ht 5' 9\" (1.753 m)   Wt (!) 342 lb 9.6 oz (155.4 kg)   SpO2 97%   BMI 50.59 kg/m²     General:  Alert, appropriate, no acute distress  HEENT:  Normocephalic, supple. Moist mucus membranes.   Nose- not noted deviated septum   Cardio:  RRR, no murmurs, S1, S2  Pulmonary:  Clear bilaterally, good air entry  Dermatologic:  No rashes or lesions  EXT: no edema  MS: normal movement   NEURO: no gross deficits     Assessment and Plan:    1. BMI 50.0-59.9, adult (HCC)  Pt counseled on importance of weight loss and exercise. Recommend 30 min of cardio activity 5 times a week. Advised small high protein meals and snacks throughout day. Avoid carbs and sugars. Increase water and not to drink liquid calories. Pt given printed info on weight loss recommendations.   Will try increasing phentermine dose x 2 mos.  Check EKG  Work on portions/ food diary/ read labels  Focus on low carb/ high protein  . Discussed risks and benefits of phentermine. Needs to  continue diet and exercise for this to work. Follow up 1 month. If having side effects such as palpitations, pt to stop med and call office.  Patient to find out if wegovy or other weight loss meds are covered     - Phentermine HCl 30 MG Oral Cap; Take 1 capsule (30 mg total) by mouth every morning.  Dispense: 30 capsule; Refill: 1    2. Hypertension, unspecified type  Controlled   CPM     3. Obesity, morbid, BMI 50 or higher (HCC)  See above   - Phentermine HCl 30 MG Oral Cap; Take 1 capsule (30 mg total) by mouth every morning.  Dispense: 30 capsule; Refill: 1    There are no diagnoses linked to this encounter.  None  No orders of the defined types were placed in this encounter.

## 2024-06-13 ENCOUNTER — OFFICE VISIT (OUTPATIENT)
Dept: INTERNAL MEDICINE CLINIC | Facility: CLINIC | Age: 44
End: 2024-06-13
Payer: COMMERCIAL

## 2024-06-13 VITALS
SYSTOLIC BLOOD PRESSURE: 100 MMHG | WEIGHT: 315 LBS | HEART RATE: 84 BPM | BODY MASS INDEX: 46.65 KG/M2 | DIASTOLIC BLOOD PRESSURE: 72 MMHG | OXYGEN SATURATION: 97 % | HEIGHT: 69 IN

## 2024-06-13 DIAGNOSIS — I10 HYPERTENSION, UNSPECIFIED TYPE: ICD-10-CM

## 2024-06-13 DIAGNOSIS — E66.01 OBESITY, MORBID, BMI 50 OR HIGHER (HCC): ICD-10-CM

## 2024-06-13 RX ORDER — PHENTERMINE HYDROCHLORIDE 30 MG/1
30 CAPSULE ORAL EVERY MORNING
Qty: 30 CAPSULE | Refills: 1 | Status: SHIPPED | OUTPATIENT
Start: 2024-06-13

## 2024-06-13 NOTE — PATIENT INSTRUCTIONS
Recommendations on weight loss:    - Drink 8 glasses of water a day/ 64 oz  - Do not drink your calories ( no regular pop, juice, high calorie coffee drinks, limit alcohol)  - Eat high protein meals and snacks- aim for 15-30 gm of protein / meal and chose snacks that are high in protein ( ex hard boiled eggs, string cheese, cottage cheese, greek yogurt. Natural fats  and lean meats are also a good source of protein.  Limit carbs to 100 gm/ day. When choosing carbs chose healthy carbs ( fruit/ veggies/ whole grain options/ sweet potatoes). Dietdoctor.com is good resource for this.  - Don't deprive yourself of food you like, just limit amount and make smart choices  - Write down everything you eat for a few days- right away and think about why you are eating ( are you hungry, or are you eating because you are bored, tired, etc)- to get back on track or use Lose it Nusrat  - Do not eat late at night  - Exercise- aim for 30 minutes 5 times a week of cardiac activity. Also strength training 2-3 times a week  30 minutes 5 times a week is recommended for weight maintenance, but for weight loss the goal is 300 minutes per/ week   - Weight yourself once a week first thing in the morning  -start taking fiber supplement daily- ex psyllium ( ex citracel, metamucil or generic psyllium ( can get at [employer] ex)). This helps keep stools regular, helps you feel full and can be good for the heart. Also increasing fiber in your diet is helpful.   Work on stress and increased sleep        1. Cut down your sugar consumption  2. Cut down refined grains/ carbs  3. Eat a good amount of protein and natural fats ( lean meats/avocado)  4. Increase natural fiber ( fruits/veggies) 5-6 servings / day      Nutritional Goals :           Calorie-controlled diet:  approx 6796-6434 khushbu ( may be more or less depending on exercise), Limit carbohydrates to <100 gms per day, Protein goal of 100 gms per day.  Increase fiber to 25-35g     Use nusrat LOSE IT or MY  FITNESS PAL to track calories  Goal to lose 1.5-2 lbs/ week     Take time to shop, read labels, plan healthy meals and snacks on the goal.  Protein shakes may help- example Premier protein or Orgain plant protein shake       Also consider weight watchers    Great resource: dietdoctor.com    Good recipes: jaquan blomercedez       MY BEST ADVICE:  EAT LOW CARB AND SUGAR- looks at labels.   EAT HIGH PROTEIN TO FILL YOU UP  AND FOODS HIGH IN FIBER  EAT LESS PROCESSED FOODS/ MORE CLEAN EATING- this makes those two ideas above easier  EXERCISE FOR MOOD/ SLEEP/ENERGY / YOUR HEART/ AND HELP WITH WEIGHT LOSS . GET GOOD SLEEP.    IF YOU HAVE A BAD DAY, DON'T BEAT YOURSELF UP AND LOSE CONTROL. JUST GET BACK ON TRACK.

## 2024-10-01 ENCOUNTER — NURSE TRIAGE (OUTPATIENT)
Dept: INTERNAL MEDICINE CLINIC | Facility: CLINIC | Age: 44
End: 2024-10-01

## 2024-10-01 ENCOUNTER — OFFICE VISIT (OUTPATIENT)
Dept: INTERNAL MEDICINE CLINIC | Facility: CLINIC | Age: 44
End: 2024-10-01
Payer: COMMERCIAL

## 2024-10-01 VITALS
SYSTOLIC BLOOD PRESSURE: 120 MMHG | WEIGHT: 315 LBS | HEIGHT: 69 IN | OXYGEN SATURATION: 96 % | HEART RATE: 95 BPM | BODY MASS INDEX: 46.65 KG/M2 | TEMPERATURE: 99 F | DIASTOLIC BLOOD PRESSURE: 64 MMHG

## 2024-10-01 DIAGNOSIS — J30.89 ENVIRONMENTAL AND SEASONAL ALLERGIES: ICD-10-CM

## 2024-10-01 DIAGNOSIS — L73.9 FOLLICULITIS: Primary | ICD-10-CM

## 2024-10-01 PROCEDURE — 99213 OFFICE O/P EST LOW 20 MIN: CPT

## 2024-10-01 PROCEDURE — 3074F SYST BP LT 130 MM HG: CPT

## 2024-10-01 PROCEDURE — 3008F BODY MASS INDEX DOCD: CPT

## 2024-10-01 PROCEDURE — 3078F DIAST BP <80 MM HG: CPT

## 2024-10-01 NOTE — TELEPHONE ENCOUNTER
Future Appointments   Date Time Provider Department Center   10/1/2024  1:30 PM Parvin Arteaga APRN [facility] [address]   10/17/2024  1:20 PM Lalitha Cruz MD [facility] [address]      Reason for Disposition   Swelling is painful to touch and no fever    Answer Assessment - Initial Assessment Questions  1. APPEARANCE of SWELLING: \"What does it look like?\"      Pain swelling and redness and pressure   2. SIZE: \"How large is the swelling?\" (e.g., inches, cm; or compare to size of pinhead, tip of pen, eraser, coin, pea, grape, ping pong ball)       Nickel to quarter    3. LOCATION: \"Where is the swelling located?\"      Left breast lump  4. ONSET: \"When did the swelling start?\"      9/30/24  5. COLOR: \"What color is it?\" \"Is there more than one color?\"      Red  6. PAIN: \"Is there any pain?\" If Yes, ask: \"How bad is the pain?\" (e.g., scale 1-10; or mild, moderate, severe)      - NONE (0): no pain    - MILD (1-3): doesn't interfere with normal activities     - MODERATE (4-7): interferes with normal activities or awakens from sleep     - SEVERE (8-10): excruciating pain, unable to do any normal activities      Moderate   7. ITCH: \"Does it itch?\" If Yes, ask: \"How bad is the itch?\"       No   8. CAUSE: \"What do you think caused the swelling?\"  9 OTHER SYMPTOMS: \"Do you have any other symptoms?\" (e.g., fever)      Unsure    Protocols used: Skin Lump or Localized Swelling-A-OH

## 2024-10-01 NOTE — TELEPHONE ENCOUNTER
Patient called as he said he has developed a cyst that has become bothersome.    He would like to have a call back from a nurse to determine where he can be seen to have the cyst looked at.

## 2024-10-02 NOTE — PROGRESS NOTES
Johnny Santos is a 44 year old malewho presents with   Chief Complaint   Patient presents with    Lump     Bump on left chest, feeling of large lump, pressure and painful when touching lump- Since Monday morning 09/30/2024.      HPI:   Reports 2 to 3 days history of pimple-like cyst located left mid chest.  No known trigger and no exposure to new skin/aundry products or chemicals. OTC remedies tried: Wife manually expressed a little purulent discharge.  History of pilonidal cyst that he needed surgically removed.  There is a residual fullness or scar there but nontender.    Meds: Losartan, phentermine, Xyzal and Flonase   PMH: Year-round allergies.   Social History:  Tobacco/Alcohol use not on file     REVIEW OF SYSTEMS:   GENERAL HEALTH: feels well otherwise; no fever or malaise; no recent viral infection/illness  SKIN: No pruritis, mild tenderness, maybe a little less tender since expressing the pus, but no further drainage;      EXAM:   /64   Pulse 95   Temp 98.6 °F (37 °C)   Ht 5' 9\" (1.753 m)   Wt (!) 352 lb (159.7 kg)   SpO2 96%   BMI 51.98 kg/m²   GENERAL: well developed, well nourished,in no apparent distress  SKIN: Rash/lesion(s): 2.5 x 2 cm fullness and minimally pink, but no fluctuance;  located left mid chest inferior to left breast; tiny scab and no drainage; no s/s secondary infection  No other itchy areas no hives  ASSESSMENT AND PLAN:   Johnny Santos is a 44 year old male who presents with   Chief Complaint   Patient presents with    Lump     Bump on left chest, feeling of large lump, pressure and painful when touching lump- Since Monday morning 09/30/2024.        ASSESSMENT:  Encounter Diagnoses   Name Primary?    Folliculitis Yes    Environmental and seasonal allergies        PLAN:  Requested Prescriptions      No prescriptions requested or ordered in this encounter     22 minutes with chart review, patient evaluation, patient education and documentation  Hibiclens to area  with 10-minute lather soak then rinse well.  Do this daily for the next several days or up to a week if needed.  Call or send Flowboxhart message if worsening in the next 3 to 5 days.    The patient indicates understanding of these issues and agrees to the plan.

## 2024-10-17 ENCOUNTER — OFFICE VISIT (OUTPATIENT)
Dept: INTERNAL MEDICINE CLINIC | Facility: CLINIC | Age: 44
End: 2024-10-17
Payer: COMMERCIAL

## 2024-10-17 VITALS
DIASTOLIC BLOOD PRESSURE: 78 MMHG | WEIGHT: 315 LBS | BODY MASS INDEX: 46.65 KG/M2 | SYSTOLIC BLOOD PRESSURE: 126 MMHG | HEIGHT: 69 IN

## 2024-10-17 DIAGNOSIS — I10 HYPERTENSION, UNSPECIFIED TYPE: ICD-10-CM

## 2024-10-17 DIAGNOSIS — E66.01 OBESITY, MORBID, BMI 50 OR HIGHER (HCC): ICD-10-CM

## 2024-10-17 NOTE — PATIENT INSTRUCTIONS
Recommendations on weight loss:    - Drink 8 glasses of water a day/ 64 oz  - Do not drink your calories ( no regular pop, juice, high calorie coffee drinks, limit alcohol)  - Eat high protein meals and snacks- aim for 15-30 gm of protein / meal and chose snacks that are high in protein ( ex hard boiled eggs, string cheese, cottage cheese, greek yogurt. Natural fats  and lean meats are also a good source of protein.  Limit carbs to 100 gm/ day. When choosing carbs chose healthy carbs ( fruit/ veggies/ whole grain options/ sweet potatoes). Dietdoctor.com is good resource for this.  - Don't deprive yourself of food you like, just limit amount and make smart choices  - Write down everything you eat for a few days- right away and think about why you are eating ( are you hungry, or are you eating because you are bored, tired, etc)- to get back on track or use Lose it Nusrat  - Do not eat late at night  - Exercise- aim for 30 minutes 5 times a week of cardiac activity. Also strength training 2-3 times a week  30 minutes 5 times a week is recommended for weight maintenance, but for weight loss the goal is 300 minutes per/ week   - Weight yourself once a week first thing in the morning  -start taking fiber supplement daily- ex psyllium ( ex citracel, metamucil or generic psyllium ( can get at [employer] ex)). This helps keep stools regular, helps you feel full and can be good for the heart. Also increasing fiber in your diet is helpful.   Work on stress and increased sleep        1. Cut down your sugar consumption  2. Cut down refined grains/ carbs  3. Eat a good amount of protein and natural fats ( lean meats/avocado)  4. Increase natural fiber ( fruits/veggies) 5-6 servings / day      Nutritional Goals :           Calorie-controlled diet:  approx 1500 khushbu ( may be more or less depending on exercise), Limit carbohydrates to <100 gms per day, Protein goal of 100 gms per day.  Increase fiber to 25-35g     Use nusrat LOSE IT or MY  FITNESS PAL to track calories      Take time to shop, read labels, plan healthy meals and snacks on the goal.  Protein shakes may help- example Premier protein       Also consider weight watchers    Great resource: dietdoctor.com    Good recipes: skinvito blog     Podcast : strong as a working mom- Katia Lauren MD. You don't need to be a working mom to find some of these tips helpful.    MY BEST ADVICE:  EAT LOW CARB AND LOW SUGAR- looks at labels.   EAT HIGH PROTEIN TO FILL YOU UP  AND FOODS HIGH IN FIBER  EAT LESS PROCESSED FOODS/ MORE CLEAN EATING- this makes those two ideas above easier  EXERCISE FOR MOOD/ SLEEP/ENERGY / YOUR HEART/ AND HELP WITH WEIGHT LOSS . GET GOOD SLEEP.    IF YOU HAVE A BAD DAY, DON'T BEAT YOURSELF UP AND LOSE CONTROL. JUST GET BACK ON TRACK.     Good sources of protein:    Chicken, lean meat, salmon, eggs, quinoa, nut butter, almonds, nuts, lentils, black beans, greek yogurt, chickpeas , cottage cheese     When reading labels- look for high protein and if carbs, better to have higher fiber  with carbs so net carbs are lower.

## 2024-10-17 NOTE — PROGRESS NOTES
CC:  Follow - Up      Hx of CC:    F/u HTN  F/u weight  Rx phentermine 30 mg - forgot had it so never took it     Trying to do 30 minutes of exercise daily   Not doing great diet       + lots of stress  Bought a house  Doing fertility    Issues are Portion control issue, grazing and eating out of boredom   Eats lunch out but bkfst and dinner at home  Lunch out is fast food   Struggles with motivation but not depressed     No focus issues  No anxiety/ depression        Social alcohol- 2-6 drinks a week    Sleep apnea test normal     Allergies:  No Known Allergies   Current Meds:  Current Outpatient Medications   Medication Sig Dispense Refill    semaglutide-weight management 0.25 MG/0.5ML Subcutaneous Solution Auto-injector Inject 0.5 mL (0.25 mg total) into the skin once a week for 4 doses. 2 mL 0    Phentermine HCl 30 MG Oral Cap Take 1 capsule (30 mg total) by mouth every morning. 30 capsule 1    Phentermine HCl 15 MG Oral Cap Take 1 capsule (15 mg total) by mouth every morning. 30 capsule 1    losartan 50 MG Oral Tab Take 1 tablet (50 mg total) by mouth daily. 90 tablet 3    fluticasone propionate (FLONASE) 50 MCG/ACT Nasal Suspension 2 sprays by Nasal route daily. 3 each 2    Levocetirizine Dihydrochloride (XYZAL) 5 MG Oral Tab Take 1 tablet (5 mg total) by mouth nightly. 90 tablet 1    montelukast (SINGULAIR) 10 MG Oral Tab Take 1 tablet (10 mg total) by mouth nightly. (Patient not taking: Reported on 10/17/2024) 90 tablet 1        History:  Past Medical History:    Allergic rhinitis    Essential hypertension    Fracture of right fibula    per [employer]:     High blood pressure      No past surgical history on file.   Family History   Problem Relation Age of Onset    Asthma Mother     Allergies Mother     Cancer Maternal Grandfather       Family Status   Relation Status    Mo (Not Specified)    MGFA (Not Specified)      Social History     Socioeconomic History    Marital status:    Tobacco Use    Smoking  status: Former     Current packs/day: 0.00     Average packs/day: 1 pack/day for 17.0 years (17.0 ttl pk-yrs)     Types: Cigarettes     Start date: 1999     Quit date: 2016     Years since quittin.5    Smokeless tobacco: Never    Tobacco comments:     No longer smoke ciggarettes, but still use electronic vape pens   Vaping Use    Vaping status: Every Day    Substances: Nicotine, Flavoring    Devices: Disposable   Substance and Sexual Activity    Alcohol use: Yes     Comment: Social    Drug use: No    Sexual activity: Not Currently   Other Topics Concern    Caffeine Concern Yes     Comment: soda    Reaction to local anesthetic No            ROS:  Feels well otherwise      Physical:    /78   Ht 5' 9\" (1.753 m)   Wt (!) 346 lb 9.6 oz (157.2 kg)   BMI 51.18 kg/m²     General:  Alert, appropriate, no acute distress  HEENT:  Normocephalic, supple. Moist mucus membranes.   Cardio:  RRR, no murmurs, S1, S2  Pulmonary:  Clear bilaterally, good air entry  Dermatologic:  No rashes or lesions  EXT: no edema  MS: normal movement   NEURO: no gross deficits     Assessment and Plan:    1. BMI 50.0-59.9, adult ([facility])  Will see if GLP-1 covered  No personal or family history of MEN syndrome or medullary thyroid cancer   Patient counselled regarding possible side effects including injection site reactions, nausea, vomiting, diarrhea, pancreatitis,  and gastroparesis . Please stop medication and notify office if any of these symptoms develop and are intolerable.  To ER if ever severe abdominal pain  Discussed that cannot take this medication during pregnancy or if trying to get pregnant  Also discussed that may gain weight back after stops medication  Patient understands  that this is a diabetic med and is being used off label for weight loss and accepts risk.   While waiting for this, will try phentermine 30 mg . Stop if any side effects  Needs to get EKG  Pt counseled on importance of weight loss and exercise.  Recommend 30 min of cardio activity 5 times a week. Advised small high protein meals and snacks throughout day. Avoid carbs and sugars. Increase water and not to drink liquid calories. Pt given printed info on weight loss recommendations.   Discussed working on motivation, meal planning   - semaglutide-weight management 0.25 MG/0.5ML Subcutaneous Solution Auto-injector; Inject 0.5 mL (0.25 mg total) into the skin once a week for 4 doses.  Dispense: 2 mL; Refill: 0    2. Obesity, morbid, BMI 50 or higher (HCC)    Same as #1  - semaglutide-weight management 0.25 MG/0.5ML Subcutaneous Solution Auto-injector; Inject 0.5 mL (0.25 mg total) into the skin once a week for 4 doses.  Dispense: 2 mL; Refill: 0    3. Hypertension, unspecified type  Controlled CPM      There are no diagnoses linked to this encounter.  None  No orders of the defined types were placed in this encounter.

## 2024-11-12 ENCOUNTER — TELEPHONE (OUTPATIENT)
Dept: INTERNAL MEDICINE CLINIC | Facility: CLINIC | Age: 44
End: 2024-11-12

## 2024-11-12 DIAGNOSIS — I10 HYPERTENSION, UNSPECIFIED TYPE: ICD-10-CM

## 2024-11-12 RX ORDER — LOSARTAN POTASSIUM 50 MG/1
50 TABLET ORAL DAILY
Qty: 90 TABLET | Refills: 3 | Status: SHIPPED | OUTPATIENT
Start: 2024-11-12

## 2025-03-18 NOTE — PROGRESS NOTES
CC:  Physical      Hx of CC:    Physical exam and   F/u HTN  F/u weight  [place]egovy not covered     Working on increasing exercise and eating better  Has lost 7 lbs in a month  Less eating bad at work and less fast food  Boxing for exercise  No CP or SOB    No fam hx heart disease       Issues are Portion control issue, grazing and eating out of boredom     Struggles with motivation but not depressed   No anxiety  May have some focus concerns      Social alcohol- a few drinks a week  Does vape nicotine     Sleep apnea test normal     Allergies:  No Known Allergies   Current Meds:  Current Outpatient Medications   Medication Sig Dispense Refill    Phentermine HCl 15 MG Oral Cap Take 1 capsule (15 mg total) by mouth every morning. 30 capsule 1    losartan 50 MG Oral Tab Take 1 tablet (50 mg total) by mouth daily. 90 tablet 3    fluticasone propionate (FLONASE) 50 MCG/ACT Nasal Suspension 2 sprays by Nasal route daily. 3 each 2    Levocetirizine Dihydrochloride (XYZAL) 5 MG Oral Tab Take 1 tablet (5 mg total) by mouth nightly. 90 tablet 1        History:  Past Medical History:    Allergic rhinitis    Essential hypertension    Fracture of right fibula    per [employer]:     High blood pressure      No past surgical history on file.   Family History   Problem Relation Age of Onset    Asthma Mother     Allergies Mother     Cancer Maternal Grandfather       Family Status   Relation Status    Mo (Not Specified)    MGFA (Not Specified)      Social History     Socioeconomic History    Marital status:    Tobacco Use    Smoking status: Former     Current packs/day: 0.00     Average packs/day: 1 pack/day for 17.0 years (17.0 ttl pk-yrs)     Types: Cigarettes     Start date: 1999     Quit date: 2016     Years since quittin.9    Smokeless tobacco: Never    Tobacco comments:     No longer smoke ciggarettes, but still use electronic vape pens   Vaping Use    Vaping status: Every Day    Substances: Nicotine, Flavoring     Devices: Disposable   Substance and Sexual Activity    Alcohol use: Yes     Comment: Social    Drug use: No    Sexual activity: Not Currently   Other Topics Concern    Caffeine Concern Yes     Comment: soda    Reaction to local anesthetic No            ROS:      GENERAL: feels well , no fevers, no weight changes  LUNGS: denies shortness of breath with exertion  CARDIOVASCULAR: denies chest pain on exertion  GI: no abd pain, diarrhea or constipation   : denies dysuria  MUSCULOSKELETAL: denies muscle pain  NEURO: denies headaches, numbness, tingling or weakness  ENDOCRINE: no hx of DM or thyroid problems        Physical:    /70   Pulse 83   Ht 5' 9\" (1.753 m)   Wt (!) 348 lb 6.4 oz (158 kg)   SpO2 96%   BMI 51.45 kg/m²     General:  Alert, appropriate, no acute distress  HEENT:  Normocephalic, supple. Moist mucus membranes.   Cardio:  RRR, no murmurs, S1, S2  Pulmonary:  Clear bilaterally, good air entry  Abdomen: Soft nontender nondistended  Dermatologic:  No rashes or lesions  EXT: no edema  MS: normal movement   NEURO: no gross deficits     Assessment and Plan:    1. Physical exam    - CBC With Differential With Platelet; Future  - Comp Metabolic Panel (14); Future  - Lipid Panel; Future  - TSH W Reflex To Free T4 [E]; Future  - Hemoglobin A1C (Glycohemoglobin) [E]; Future    2. BMI 50.0-59.9, adult (HCC)  Pt counseled on importance of weight loss and exercise. Recommend 30 min of cardio activity 5 times a week. Advised small high protein meals and snacks throughout day. Avoid carbs and sugars. Increase water and not to drink liquid calories. Pt given printed info on weight loss recommendations.   GLP-1 was not covered when last written for  He did have some success with phentermine in the past but did not stay on it long.  Recommend trial of phentermine 15 mg for 1 to 2 months and follow-up with me afterwards.  Work hard on increasing exercise and decreasing carbs and sugar in the diet.  Needs new  EKG  Will start pt on phentermine. Discussed risks and benefits. Needs to continue diet and exercise for this to work. Follow up 2 month. If having side effects such as palpitations, pt to stop med and call office. Will monitor his blood pressure   - Phentermine HCl 15 MG Oral Cap; Take 1 capsule (15 mg total) by mouth every morning.  Dispense: 30 capsule; Refill: 1    3. Hypertension, unspecified type  Controlled CPM   - losartan 50 MG Oral Tab; Take 1 tablet (50 mg total) by mouth daily.  Dispense: 90 tablet; Refill: 3    4. Medication monitoring encounter      - EKG 12 Lead to be performed at [facility]; Future    5. Class 3 severe obesity due to excess calories with serious comorbidity and body mass index (BMI) of 50.0 to 59.9 in adult (HCC)  See #2    - EKG 12 Lead to be performed at [facility]; Future          There are no diagnoses linked to this encounter.  EKG 12-LEAD  Orders Placed This Encounter   Procedures    CBC With Differential With Platelet     Standing Status:   Future     Standing Expiration Date:   3/19/2026    Comp Metabolic Panel (14)     Standing Status:   Future     Standing Expiration Date:   3/19/2026    Lipid Panel     Standing Status:   Future     Standing Expiration Date:   3/19/2026    TSH W Reflex To Free T4 [E]     Standing Status:   Future     Standing Expiration Date:   3/19/2026     Order Specific Question:   Release to patient     Answer:   Immediate    Hemoglobin A1C (Glycohemoglobin) [E]     Standing Status:   Future     Standing Expiration Date:   3/19/2026     Order Specific Question:   Release to patient     Answer:   Immediate

## 2025-03-19 ENCOUNTER — OFFICE VISIT (OUTPATIENT)
Dept: INTERNAL MEDICINE CLINIC | Facility: CLINIC | Age: 45
End: 2025-03-19
Payer: COMMERCIAL

## 2025-03-19 VITALS
DIASTOLIC BLOOD PRESSURE: 70 MMHG | BODY MASS INDEX: 46.65 KG/M2 | SYSTOLIC BLOOD PRESSURE: 120 MMHG | HEIGHT: 69 IN | WEIGHT: 315 LBS | OXYGEN SATURATION: 96 % | HEART RATE: 83 BPM

## 2025-03-19 DIAGNOSIS — I10 HYPERTENSION, UNSPECIFIED TYPE: ICD-10-CM

## 2025-03-19 DIAGNOSIS — Z51.81 MEDICATION MONITORING ENCOUNTER: ICD-10-CM

## 2025-03-19 DIAGNOSIS — E66.01 CLASS 3 SEVERE OBESITY DUE TO EXCESS CALORIES WITH SERIOUS COMORBIDITY AND BODY MASS INDEX (BMI) OF 50.0 TO 59.9 IN ADULT (HCC): ICD-10-CM

## 2025-03-19 DIAGNOSIS — E66.813 CLASS 3 SEVERE OBESITY DUE TO EXCESS CALORIES WITH SERIOUS COMORBIDITY AND BODY MASS INDEX (BMI) OF 50.0 TO 59.9 IN ADULT (HCC): ICD-10-CM

## 2025-03-19 DIAGNOSIS — Z00.00 PHYSICAL EXAM: Primary | ICD-10-CM

## 2025-03-19 RX ORDER — LOSARTAN POTASSIUM 50 MG/1
50 TABLET ORAL DAILY
Qty: 90 TABLET | Refills: 3 | Status: SHIPPED | OUTPATIENT
Start: 2025-03-19

## 2025-03-19 RX ORDER — PHENTERMINE HYDROCHLORIDE 15 MG/1
15 CAPSULE ORAL EVERY MORNING
Qty: 30 CAPSULE | Refills: 1 | Status: SHIPPED | OUTPATIENT
Start: 2025-03-19

## 2025-03-19 NOTE — PATIENT INSTRUCTIONS
If I am prescribing weight loss medicine for you, you need to see me every 3 months for refills and for the best success!   The goal is not always weight loss, but better health. This can be measured with labs,  your vital signs and how you are feeling.  Please discuss any questions or challenges you are having with me.      Weight loss recommendations:    For diet:   Focus on less carbs and more protein in your diet.          Limit carbohydrates to <100 gms per day, Protein goal of 100 gms per day ( or 1.3-1.6 grams of protein per kilogram per day).  Increase fiber to 25-35g   To do this: cut down on sugar, carbs, increase protein and natural fats ( lean meats, avocado, eggs ), increase natural fiber with fruits and veggies   Good sources of protein:  Chicken, lean meat, salmon,  shrimp, eggs, quinoa, nut butter, almonds, nuts, lentils, black beans, greek yogurt, chickpeas , cottage cheese     When reading labels- look for high protein and if carbs, better to have higher fiber  with carbs so net carbs are lower.     Take time to shop, read labels, plan healthy meals and snacks on the goal.  Protein shakes may help- example Premier protein     - Drink 8 glasses of water a day/ 64 oz  - Do not drink your calories ( no regular pop, juice, high calorie coffee drinks, limit alcohol)  - Eat high protein meals and snacks- aim for 15-30 gm of protein / meal and chose snacks that are high in protein ( ex hard boiled eggs, string cheese, cottage cheese, greek yogurt. Natural fats  and lean meats are also a good source of protein.  Limit carbs to 100 gm/ day. When choosing carbs chose healthy carbs ( fruit/ veggies/ whole grain options/ sweet potatoes). Dietdoctor.com is good resource for this.  - Don't deprive yourself of food you like, just limit amount and make smart choices    Exercise:   - Exercise- aim for 30 minutes 5 times a week of cardiac activity. Also strength training 2-3 times a week  30 minutes 5 times a week  is recommended for weight maintenance, but for weight loss the goal is 300 minutes per/ week   [employer] N[employer]rat  Even with those goals above, some exercise is better then none. If you only have 20 minutes in the morning, do it!     Behavior:   - Write down everything you eat for a few days- right away and think about why you are eating ( are you hungry, or are you eating because you are bored, tired, etc)- to get back on track or use Lose it Nusrat  - Do not eat late at night  -work on stress and sleep.  - Weight yourself once a week first thing in the morning  Use nusrat LOSE IT or MY FITNESS PAL to track calories  Calm nusrat to help with stress     Supplements:  Vitamin D  -start taking fiber supplement daily- ex psyllium ( ex citracel, metamucil or generic psyllium ( can get at [employer] ex)). This helps keep stools regular, helps you feel full and can be good for the heart. Also increasing fiber in your diet is helpful.     Resources:       Healthy food info: dietdoctor.com    Good recipes: skinvito blog     Podcast : strong as a working mom- Katia Lauren MD. You don't need to be a working mom to find some of these tips helpful.        MY BEST ADVICE:  EAT LOW CARB AND LOW SUGAR- looks at labels.   EAT HIGH PROTEIN TO FILL YOU UP  AND FOODS HIGH IN FIBER  EAT LESS PROCESSED FOODS/ MORE CLEAN EATING- this makes those two ideas above easier  EXERCISE FOR MOOD/ SLEEP/ENERGY / YOUR HEART/ AND HELP WITH WEIGHT LOSS . GET GOOD SLEEP.    IF YOU HAVE A BAD DAY, DON'T BEAT YOURSELF UP AND LOSE CONTROL. JUST GET BACK ON TRACK.

## 2025-04-02 ENCOUNTER — LAB ENCOUNTER (OUTPATIENT)
Dept: LAB | Facility: HOSPITAL | Age: 45
End: 2025-04-02
Attending: FAMILY MEDICINE
Payer: COMMERCIAL

## 2025-04-02 DIAGNOSIS — Z51.81 MEDICATION MONITORING ENCOUNTER: ICD-10-CM

## 2025-04-02 DIAGNOSIS — E66.813 CLASS 3 SEVERE OBESITY DUE TO EXCESS CALORIES WITH SERIOUS COMORBIDITY AND BODY MASS INDEX (BMI) OF 50.0 TO 59.9 IN ADULT (HCC): ICD-10-CM

## 2025-04-02 DIAGNOSIS — Z00.00 PHYSICAL EXAM: ICD-10-CM

## 2025-04-02 DIAGNOSIS — E66.01 CLASS 3 SEVERE OBESITY DUE TO EXCESS CALORIES WITH SERIOUS COMORBIDITY AND BODY MASS INDEX (BMI) OF 50.0 TO 59.9 IN ADULT (HCC): ICD-10-CM

## 2025-04-02 LAB
ALBUMIN SERPL-MCNC: 4.5 G/DL (ref 3.2–4.8)
ALBUMIN/GLOB SERPL: 1.5 {RATIO} (ref 1–2)
ALP LIVER SERPL-CCNC: 81 U/L
ALT SERPL-CCNC: 25 U/L
ANION GAP SERPL CALC-SCNC: 8 MMOL/L (ref 0–18)
AST SERPL-CCNC: 21 U/L (ref ?–34)
BASOPHILS # BLD AUTO: 0.06 X10(3) UL (ref 0–0.2)
BASOPHILS NFR BLD AUTO: 0.7 %
BILIRUB SERPL-MCNC: 0.8 MG/DL (ref 0.3–1.2)
BUN BLD-MCNC: 14 MG/DL (ref 9–23)
BUN/CREAT SERPL: 16.5 (ref 10–20)
CALCIUM BLD-MCNC: 9.1 MG/DL (ref 8.7–10.4)
CHLORIDE SERPL-SCNC: 104 MMOL/L (ref 98–112)
CHOLEST SERPL-MCNC: 197 MG/DL (ref ?–200)
CO2 SERPL-SCNC: 26 MMOL/L (ref 21–32)
CREAT BLD-MCNC: 0.85 MG/DL
DEPRECATED RDW RBC AUTO: 42.7 FL (ref 35.1–46.3)
EGFRCR SERPLBLD CKD-EPI 2021: 111 ML/MIN/1.73M2 (ref 60–?)
EOSINOPHIL # BLD AUTO: 0.09 X10(3) UL (ref 0–0.7)
EOSINOPHIL NFR BLD AUTO: 1 %
ERYTHROCYTE [DISTWIDTH] IN BLOOD BY AUTOMATED COUNT: 12.4 % (ref 11–15)
EST. AVERAGE GLUCOSE BLD GHB EST-MCNC: 91 MG/DL (ref 68–126)
FASTING PATIENT LIPID ANSWER: NO
FASTING STATUS PATIENT QL REPORTED: NO
GLOBULIN PLAS-MCNC: 3.1 G/DL (ref 2–3.5)
GLUCOSE BLD-MCNC: 80 MG/DL (ref 70–99)
HBA1C MFR BLD: 4.8 % (ref ?–5.7)
HCT VFR BLD AUTO: 39.5 %
HDLC SERPL-MCNC: 37 MG/DL (ref 40–59)
HGB BLD-MCNC: 13.9 G/DL
IMM GRANULOCYTES # BLD AUTO: 0.02 X10(3) UL (ref 0–1)
IMM GRANULOCYTES NFR BLD: 0.2 %
LDLC SERPL CALC-MCNC: 139 MG/DL (ref ?–100)
LYMPHOCYTES # BLD AUTO: 3.13 X10(3) UL (ref 1–4)
LYMPHOCYTES NFR BLD AUTO: 35.5 %
MCH RBC QN AUTO: 32.8 PG (ref 26–34)
MCHC RBC AUTO-ENTMCNC: 35.2 G/DL (ref 31–37)
MCV RBC AUTO: 93.2 FL
MONOCYTES # BLD AUTO: 0.78 X10(3) UL (ref 0.1–1)
MONOCYTES NFR BLD AUTO: 8.8 %
NEUTROPHILS # BLD AUTO: 4.74 X10 (3) UL (ref 1.5–7.7)
NEUTROPHILS # BLD AUTO: 4.74 X10(3) UL (ref 1.5–7.7)
NEUTROPHILS NFR BLD AUTO: 53.8 %
NONHDLC SERPL-MCNC: 160 MG/DL (ref ?–130)
OSMOLALITY SERPL CALC.SUM OF ELEC: 285 MOSM/KG (ref 275–295)
PLATELET # BLD AUTO: 244 10(3)UL (ref 150–450)
POTASSIUM SERPL-SCNC: 3.8 MMOL/L (ref 3.5–5.1)
PROT SERPL-MCNC: 7.6 G/DL (ref 5.7–8.2)
RBC # BLD AUTO: 4.24 X10(6)UL
SODIUM SERPL-SCNC: 138 MMOL/L (ref 136–145)
TRIGL SERPL-MCNC: 113 MG/DL (ref 30–149)
TSI SER-ACNC: 1.68 UIU/ML (ref 0.55–4.78)
VLDLC SERPL CALC-MCNC: 21 MG/DL (ref 0–30)
WBC # BLD AUTO: 8.8 X10(3) UL (ref 4–11)

## 2025-04-02 PROCEDURE — 80061 LIPID PANEL: CPT | Performed by: FAMILY MEDICINE

## 2025-04-02 PROCEDURE — 80050 GENERAL HEALTH PANEL: CPT | Performed by: FAMILY MEDICINE

## 2025-04-02 PROCEDURE — 83036 HEMOGLOBIN GLYCOSYLATED A1C: CPT | Performed by: FAMILY MEDICINE

## 2025-04-03 LAB
ATRIAL RATE: 60 BPM
P AXIS: 6 DEGREES
P-R INTERVAL: 160 MS
Q-T INTERVAL: 414 MS
QRS DURATION: 102 MS
QTC CALCULATION (BEZET): 414 MS
R AXIS: 30 DEGREES
T AXIS: 8 DEGREES
VENTRICULAR RATE: 60 BPM

## 2025-07-28 ENCOUNTER — HOSPITAL ENCOUNTER (OUTPATIENT)
Age: 45
Discharge: HOME | End: 2025-07-28
Payer: COMMERCIAL

## 2025-07-28 VITALS
SYSTOLIC BLOOD PRESSURE: 117 MMHG | HEART RATE: 78 BPM | TEMPERATURE: 98 F | OXYGEN SATURATION: 95 % | RESPIRATION RATE: 18 BRPM | DIASTOLIC BLOOD PRESSURE: 71 MMHG

## 2025-07-28 DIAGNOSIS — H02.89 PAIN AND SWELLING OF LOWER EYELID OF LEFT EYE: Primary | ICD-10-CM

## 2025-07-28 DIAGNOSIS — H02.845 PAIN AND SWELLING OF LOWER EYELID OF LEFT EYE: Primary | ICD-10-CM

## 2025-07-28 PROCEDURE — 99213 OFFICE O/P EST LOW 20 MIN: CPT | Performed by: PHYSICIAN ASSISTANT

## 2025-07-28 RX ORDER — ERYTHROMYCIN 5 MG/G
1 OINTMENT OPHTHALMIC EVERY 6 HOURS
Qty: 1 G | Refills: 0 | Status: SHIPPED | OUTPATIENT
Start: 2025-07-28 | End: 2025-08-04

## 2025-08-20 ENCOUNTER — OFFICE VISIT (OUTPATIENT)
Dept: INTERNAL MEDICINE CLINIC | Facility: CLINIC | Age: 45
End: 2025-08-20

## 2025-08-20 ENCOUNTER — TELEPHONE (OUTPATIENT)
Dept: INTERNAL MEDICINE CLINIC | Facility: CLINIC | Age: 45
End: 2025-08-20

## 2025-08-20 VITALS
HEART RATE: 75 BPM | OXYGEN SATURATION: 97 % | BODY MASS INDEX: 46.65 KG/M2 | DIASTOLIC BLOOD PRESSURE: 82 MMHG | HEIGHT: 69 IN | SYSTOLIC BLOOD PRESSURE: 118 MMHG | WEIGHT: 315 LBS

## 2025-08-20 DIAGNOSIS — I10 HYPERTENSION, UNSPECIFIED TYPE: ICD-10-CM

## 2025-08-20 DIAGNOSIS — E66.01 OBESITY, MORBID, BMI 50 OR HIGHER (HCC): Primary | ICD-10-CM

## 2025-08-20 PROCEDURE — 3074F SYST BP LT 130 MM HG: CPT | Performed by: FAMILY MEDICINE

## 2025-08-20 PROCEDURE — 3008F BODY MASS INDEX DOCD: CPT | Performed by: FAMILY MEDICINE

## 2025-08-20 PROCEDURE — 99214 OFFICE O/P EST MOD 30 MIN: CPT | Performed by: FAMILY MEDICINE

## 2025-08-20 PROCEDURE — G2211 COMPLEX E/M VISIT ADD ON: HCPCS | Performed by: FAMILY MEDICINE

## 2025-08-20 PROCEDURE — 3079F DIAST BP 80-89 MM HG: CPT | Performed by: FAMILY MEDICINE

## 2025-08-20 RX ORDER — TIRZEPATIDE 2.5 MG/.5ML
2.5 INJECTION, SOLUTION SUBCUTANEOUS WEEKLY
Qty: 2 ML | Refills: 1 | Status: SHIPPED | OUTPATIENT
Start: 2025-08-20 | End: 2025-09-11

## (undated) NOTE — MR AVS SNAPSHOT
[id number] [facility] [address]  [place] [id number] [phone]               Thank you for choosing us for your health care visit with Edwina Barber NP.   We are glad to serve you and happy to provide you with this summary of your vi · Dress in soft, loose cotton clothing. · Don’t bathe in hot water. This can make the itching worse. · Apply an ice pack or cool pack wrapped in a thin towel to your skin. This will help reduce redness and itching.  But if your hives were caused by exp[employer]u 88497. All rights reserved. This information is not intended as a substitute for professional medical care. Always follow your healthcare professional's instructions.         Understanding Hives (Urticaria)  Urticaria (hives) are red, itchy, and swollen are if you have it, and call 911 or go to the emergency room.     When to call your healthcare provider  Call your healthcare provider if:  · Your hives feel uncomfortable  · You have never had hives before  When to call 911  Call 911 right away if you have: If you have questions, you can call [phone] to talk to our [facility] Staff. Remember, [employer] is NOT to be used for urgent needs. For medical emergencies, dial 911.         Educational Information     Healthy Diet and Regular Exercise  [employer]

## (undated) NOTE — Clinical Note
I saw Xie Sendy in the DEPARTMENT OF [facility] today for Viral uri  (primary encounter diagnosis). he was treated with otc and comfort care. Burgess Kaba will follow up with you if no better or as needed.  Thank you for the opportunity to care for [facility]

## (undated) NOTE — ED AVS SNAPSHOT
Yasmani Brown   MRN: [id number]    Department:  [facility] Emergency Department   Date of Visit:  10/29/2018           Disclosure     Insurance plans vary and the physician(s) referred by the ER may not be covered by your plan.  Please con CARE PHYSICIAN AT ONCE OR RETURN IMMEDIATELY TO THE EMERGENCY DEPARTMENT. If you have been prescribed any medication(s), please fill your prescription right away and begin taking the medication(s) as directed.   If you believe that any of the medications